# Patient Record
Sex: MALE | Race: WHITE | Employment: FULL TIME | URBAN - METROPOLITAN AREA
[De-identification: names, ages, dates, MRNs, and addresses within clinical notes are randomized per-mention and may not be internally consistent; named-entity substitution may affect disease eponyms.]

---

## 2021-09-20 RX ORDER — ARIPIPRAZOLE 2 MG/1
2 TABLET ORAL DAILY
COMMUNITY
End: 2022-07-14

## 2021-09-20 RX ORDER — ALBUTEROL SULFATE 90 UG/1
2 AEROSOL, METERED RESPIRATORY (INHALATION) EVERY 6 HOURS PRN
COMMUNITY

## 2021-09-20 RX ORDER — SERTRALINE HYDROCHLORIDE 100 MG/1
100 TABLET, FILM COATED ORAL DAILY
COMMUNITY

## 2021-09-20 RX ORDER — LORATADINE 10 MG/1
10 TABLET ORAL DAILY
COMMUNITY

## 2021-09-20 RX ORDER — METHOCARBAMOL 750 MG/1
750 TABLET, FILM COATED ORAL 4 TIMES DAILY PRN
COMMUNITY

## 2021-09-20 RX ORDER — BUTALBITAL, ACETAMINOPHEN AND CAFFEINE 50; 325; 40 MG/1; MG/1; MG/1
1 TABLET ORAL EVERY 4 HOURS PRN
COMMUNITY

## 2021-09-20 RX ORDER — BUPROPION HYDROCHLORIDE 150 MG/1
150 TABLET, EXTENDED RELEASE ORAL 2 TIMES DAILY
COMMUNITY
End: 2021-09-21

## 2021-09-20 RX ORDER — CELECOXIB 100 MG/1
100 CAPSULE ORAL 2 TIMES DAILY
COMMUNITY

## 2021-09-20 RX ORDER — FLUTICASONE PROPIONATE 50 MCG
1 SPRAY, SUSPENSION (ML) NASAL DAILY
COMMUNITY

## 2021-09-20 RX ORDER — ATENOLOL 25 MG/1
25 TABLET ORAL PRN
COMMUNITY

## 2021-09-20 RX ORDER — HYDROXYZINE PAMOATE 25 MG/1
25 CAPSULE ORAL 3 TIMES DAILY PRN
COMMUNITY

## 2021-09-20 RX ORDER — GABAPENTIN 600 MG/1
600 TABLET ORAL 4 TIMES DAILY
COMMUNITY

## 2021-09-20 RX ORDER — MORPHINE SULFATE 15 MG/1
15 TABLET, FILM COATED, EXTENDED RELEASE ORAL 2 TIMES DAILY
COMMUNITY
End: 2022-07-14

## 2021-09-20 RX ORDER — HYDROCODONE BITARTRATE AND ACETAMINOPHEN 5; 325 MG/1; MG/1
1 TABLET ORAL EVERY 6 HOURS PRN
COMMUNITY
End: 2022-07-14

## 2021-09-21 RX ORDER — APREMILAST 30 MG/1
TABLET, FILM COATED ORAL 2 TIMES DAILY
COMMUNITY

## 2021-09-21 NOTE — PROGRESS NOTES
Helixate order in epic. Order recommendations per Hematologist in [facility] on 8/26 and printed in chart. Spoke with Genoveva in pharmacy. She stated medication is already in pharmacy and order in [facility]. Nothing else needs to be done.   All coordinators notified via email. Vivian Traore

## 2021-09-21 NOTE — PROGRESS NOTES
[address] [facility] patients having surgery or anesthesia are required to be Covid tested OR to have been vaccinated at least 14 days prior to your procedure. It is very important to return our call to [phone] and notify the staff of your last vaccination date otherwise you will be required to complete Covid PCR test within the 5-6 days prior to surgery & quarantine. The results will need to be faxed to PreAdmission Testing at [phone]. PRIOR TO PROCEDURE DATE:        1. PLEASE FOLLOW ANY  GUIDELINES/ INSTRUCTIONS PRIOR TO YOUR PROCEDURE AS ADVISED BY YOUR SURGEON. 2. Arrange for someone to drive you home and be with you for the first 24 hours after discharge for your safety after your procedure for which you received sedation. Ensure it is someone we can share information with regarding your discharge. 3. You must contact your surgeon for instructions IF:   You are taking any blood thinners, aspirin, anti-inflammatory or vitamin E.   There is a change in your physical condition such as a cold, fever, rash, cuts, sores or any other infection, especially near your surgical site. 4. Do not drink alcohol the day before or day of your procedure. 5. A Pre-op History and Physical for surgery MUST be completed by your Physician or Urgent Care within 30 days of your procedure date. Please bring a copy with you on the day of your procedure and along with any other testing performed. THE DAY OF YOUR PROCEDURE:  1. Follow instructions for ARRIVAL TIME as DIRECTED BY YOUR SURGEON. 2. Enter the MAIN entrance from [address] [place] [address] and follow the signs to the free [employer] or [employer] parking (offered free of charge 6am-5pm). 3. Enter the Main Entrance of the hospital (do not enter from the lower level of the parking garage). Upon entrance, check in with the  at the main desk on your left. If no one is available at the desk, proceed into the [facility] Waiting Room and go through the door directly into the [facility]. There is a Check-in desk ACROSS from Room 5 (marked with a sign hanging from the ceiling). The phone number for the surgery center is [phone]. 4. Please call [phone] option #2 option #2 if you have not been preregistered yet. On the day of your procedure bring your insurance card and photo ID. You will be registered at your bedside once brought back to your room. 5. DO NOT EAT ANYTHING eight hours prior to your arrival for surgery. May have 8 ounces of water 4 hours prior to your arrival for surgery. NOTE: ALL Gastric, Bariatric and Bowel surgery patients MUST follow their surgeon's instructions. 6. MEDICATIONS    Take the following medications with a SMALL sip of water: MAY HAVE MEDICATIONS AS NEEDED AS LONG AS TOLERATED WITH SIP OF WATER    Bariatric patient's call surgeon if on diabetic medications as some need to be stopped 1 week preop   Use your usual dose of inhalers the morning of surgery. BRING your rescue inhaler with you to hospital.    Anesthesia does NOT want you to take insulin the morning of surgery. They will control your blood sugar while you are at the hospital. Please contact your ordering physician for instructions regarding your insulin the night before your procedure. If you have an insulin pump, please keep it set on basal rate. 7. Do not swallow water when brushing teeth. No gum, candy, mints or ice chips. Refrain from smoking or at least decrease the amount. 8. Dress in loose, comfortable clothing appropriate for redressing after your procedure. Do not wear jewelry (including body piercings), make-up (especially NO eye make-up), fingernail polish (NO toenail polish if foot/leg surgery), lotion, powders or metal hairclips. 9. Dentures, glasses, or contacts will need to be removed before your procedure.  Bring cases for your glasses, contacts, dentures, or hearing aids to protect them while you are in surgery. 10. If you use a CPAP, please bring it with you on the day of your procedure. 11. We recommend that valuable personal  belongings such as cash, cell phones, e-tablets or jewelry, be left at home during your stay. The hospital will not be responsible for valuables that are not secured in the hospital safe. However, if your insurance requires a co-pay, you may want to bring a method of payment, i.e. Check or credit card, if you wish to pay your co-pay the day of surgery. 12. If you are to stay overnight, you may bring a bag with personal items. Please have any large items you may need brought in by your family after your arrival to your hospital room. 15. If you have a Living Will or Durable Power of , please bring a copy on the day of your procedure. 15. With your permission, one family member may accompany you while you are being prepared for surgery. Once you are ready, additional family members may join you. HOW WE KEEP YOU SAFE and WORK TO PREVENT SURGICAL SITE INFECTIONS:  1. Health care workers should always check your ID bracelet to verify your name and birth date. You will be asked many times to state your name, date of birth, and allergies. 2. Health care workers should always clean their hands with soap or alcohol gel before providing care to you. It is okay to ask anyone if they cleaned their hands before they touch you. 3. You will be actively involved in verifying the type of procedure you are having and ensuring the correct surgical site. This will be confirmed multiple times prior to your procedure. Do NOT jamie your surgery site UNLESS instructed to by your surgeon. 4. Do not shave or wax for 72 hours prior to procedure near your operative site. Shaving with a razor can irritate your skin and make it easier to develop an infection.  On the day of your procedure, any hair that needs to be removed near the surgical site will be clipped by a healthcare worker using a special clippers designed to avoid skin irritation. 5. When you are in the operating room, your surgical site will be cleansed with a special soap, and in most cases, you will be given an antibiotic before the surgery begins. What to expect AFTER YOUR PROCEDURE:  1. Immediately following your procedure, your will be taken to the PACU for the first phase of your recovery. Your nurse will help you recover from any potential side effects of anesthesia, such as extreme drowsiness, changes in your vital signs or breathing patterns. Nausea, headache, muscle aches, or sore throat may also occur after anesthesia. Your nurse will help you manage these potential side effects. 2. For comfort and safety, arrange to have someone at home with you for the first 24 hours after discharge. 3. You and your family will be given written instructions about your diet, activity, dressing care, medications, and return visits. 4. Once at home, should issues with nausea, pain, or bleeding occur, or should you notice any signs of infection, you should call your surgeon. 5. Always clean your hands before and after caring for your wound. Do not let your family touch your surgery site without cleaning their hands. 6. Narcotic pain medications can cause significant constipation. You may want to add a stool softener to your postoperative medication schedule or speak to your surgeon on how best to manage this SIDE EFFECT. SPECIAL INSTRUCTIONS     Thank you for allowing us to care for you. We strive to exceed your expectations in the delivery of care and service provided to you and your family. If you need to contact the [place] [address] staff for any reason, please call us at [phone]    Instructions reviewed with patient during preadmission testing phone interview.   Mavis Hoyt RN.9/21/2021 .8:45 AM      ADDITIONAL EDUCATIONAL INFORMATION REVIEWED PER PHONE WITH YOU AND/OR YOUR FAMILY:  Yes Hibiclens® Bathing Instructions - per surgeon   No Antibacterial Soap

## 2021-09-22 ENCOUNTER — ANESTHESIA EVENT (OUTPATIENT)
Dept: OPERATING ROOM | Age: 41
End: 2021-09-22
Payer: COMMERCIAL

## 2021-09-23 ENCOUNTER — ANESTHESIA (OUTPATIENT)
Dept: OPERATING ROOM | Age: 41
End: 2021-09-23
Payer: COMMERCIAL

## 2021-09-23 ENCOUNTER — HOSPITAL ENCOUNTER (OUTPATIENT)
Age: 41
Setting detail: OBSERVATION
Discharge: HOME | End: 2021-09-24
Attending: ORTHOPAEDIC SURGERY | Admitting: ORTHOPAEDIC SURGERY
Payer: COMMERCIAL

## 2021-09-23 ENCOUNTER — APPOINTMENT (OUTPATIENT)
Dept: GENERAL RADIOLOGY | Age: 41
End: 2021-09-23
Attending: ORTHOPAEDIC SURGERY
Payer: COMMERCIAL

## 2021-09-23 VITALS — DIASTOLIC BLOOD PRESSURE: 54 MMHG | TEMPERATURE: 97.7 F | SYSTOLIC BLOOD PRESSURE: 97 MMHG | OXYGEN SATURATION: 91 %

## 2021-09-23 PROBLEM — M19.072 ARTHRITIS OF LEFT ANKLE: Status: ACTIVE | Noted: 2021-09-23

## 2021-09-23 PROCEDURE — 3600000004 HC SURGERY LEVEL 4 BASE: Performed by: ORTHOPAEDIC SURGERY

## 2021-09-23 PROCEDURE — 2580000003 HC RX 258: Performed by: ORTHOPAEDIC SURGERY

## 2021-09-23 PROCEDURE — 6360000002 HC RX W HCPCS: Performed by: ANESTHESIOLOGY

## 2021-09-23 PROCEDURE — C1713 ANCHOR/SCREW BN/BN,TIS/BN: HCPCS | Performed by: ORTHOPAEDIC SURGERY

## 2021-09-23 PROCEDURE — C1776 JOINT DEVICE (IMPLANTABLE): HCPCS | Performed by: ORTHOPAEDIC SURGERY

## 2021-09-23 PROCEDURE — 6370000000 HC RX 637 (ALT 250 FOR IP): Performed by: ORTHOPAEDIC SURGERY

## 2021-09-23 PROCEDURE — G0378 HOSPITAL OBSERVATION PER HR: HCPCS

## 2021-09-23 PROCEDURE — 64445 NJX AA&/STRD SCIATIC NRV IMG: CPT | Performed by: ANESTHESIOLOGY

## 2021-09-23 PROCEDURE — 3600000014 HC SURGERY LEVEL 4 ADDTL 15MIN: Performed by: ORTHOPAEDIC SURGERY

## 2021-09-23 PROCEDURE — C9359 IMPLNT,BON VOID FILLER-PUTTY: HCPCS | Performed by: ORTHOPAEDIC SURGERY

## 2021-09-23 PROCEDURE — 2580000003 HC RX 258: Performed by: ANESTHESIOLOGY

## 2021-09-23 PROCEDURE — 2780000010 HC IMPLANT OTHER: Performed by: ORTHOPAEDIC SURGERY

## 2021-09-23 PROCEDURE — 6360000002 HC RX W HCPCS: Performed by: NURSE ANESTHETIST, CERTIFIED REGISTERED

## 2021-09-23 PROCEDURE — 2720000010 HC SURG SUPPLY STERILE: Performed by: ORTHOPAEDIC SURGERY

## 2021-09-23 PROCEDURE — 3700000000 HC ANESTHESIA ATTENDED CARE: Performed by: ORTHOPAEDIC SURGERY

## 2021-09-23 PROCEDURE — 2709999900 HC NON-CHARGEABLE SUPPLY: Performed by: ORTHOPAEDIC SURGERY

## 2021-09-23 PROCEDURE — 7100000000 HC PACU RECOVERY - FIRST 15 MIN: Performed by: ORTHOPAEDIC SURGERY

## 2021-09-23 PROCEDURE — 2500000003 HC RX 250 WO HCPCS: Performed by: NURSE ANESTHETIST, CERTIFIED REGISTERED

## 2021-09-23 PROCEDURE — 6360000002 HC RX W HCPCS: Performed by: ORTHOPAEDIC SURGERY

## 2021-09-23 PROCEDURE — 3209999900 FLUORO FOR SURGICAL PROCEDURES

## 2021-09-23 PROCEDURE — 64447 NJX AA&/STRD FEMORAL NRV IMG: CPT | Performed by: ANESTHESIOLOGY

## 2021-09-23 PROCEDURE — 94760 N-INVAS EAR/PLS OXIMETRY 1: CPT

## 2021-09-23 PROCEDURE — 7100000001 HC PACU RECOVERY - ADDTL 15 MIN: Performed by: ORTHOPAEDIC SURGERY

## 2021-09-23 PROCEDURE — 3700000001 HC ADD 15 MINUTES (ANESTHESIA): Performed by: ORTHOPAEDIC SURGERY

## 2021-09-23 PROCEDURE — 73600 X-RAY EXAM OF ANKLE: CPT

## 2021-09-23 DEVICE — DBX PUTTY, 1CC
Type: IMPLANTABLE DEVICE | Site: ANKLE | Status: FUNCTIONAL
Brand: DBX®

## 2021-09-23 DEVICE — IMPLANTABLE DEVICE: Type: IMPLANTABLE DEVICE | Site: ANKLE | Status: FUNCTIONAL

## 2021-09-23 DEVICE — OSSIOFIBER® TRIMMABLE FIXATION NAIL SYSTEM, 2.4X50MM.PACKAGE CONTAINS: 1 IMPLANT, 1 INSERTION SLEEVE, 1 BONE TAMP, 1 DEPTH GAUGE, 2 K-WIRES, 1 CANNULATED DRILL BIT.
Type: IMPLANTABLE DEVICE | Site: ANKLE | Status: FUNCTIONAL
Brand: OSSIOFIBER® TRIMMABLE FIXATION NAIL SYSTEM

## 2021-09-23 RX ORDER — GABAPENTIN 600 MG/1
600 TABLET ORAL 4 TIMES DAILY
Status: DISCONTINUED | OUTPATIENT
Start: 2021-09-23 | End: 2021-09-24 | Stop reason: HOSPADM

## 2021-09-23 RX ORDER — FLUTICASONE PROPIONATE 50 MCG
1 SPRAY, SUSPENSION (ML) NASAL DAILY
Status: DISCONTINUED | OUTPATIENT
Start: 2021-09-24 | End: 2021-09-24 | Stop reason: HOSPADM

## 2021-09-23 RX ORDER — SODIUM CHLORIDE 0.9 % (FLUSH) 0.9 %
5-40 SYRINGE (ML) INJECTION PRN
Status: DISCONTINUED | OUTPATIENT
Start: 2021-09-23 | End: 2021-09-24 | Stop reason: HOSPADM

## 2021-09-23 RX ORDER — ARIPIPRAZOLE 2 MG/1
2 TABLET ORAL NIGHTLY
Status: DISCONTINUED | OUTPATIENT
Start: 2021-09-23 | End: 2021-09-24 | Stop reason: HOSPADM

## 2021-09-23 RX ORDER — MIDAZOLAM HYDROCHLORIDE 1 MG/ML
INJECTION INTRAMUSCULAR; INTRAVENOUS
Status: COMPLETED
Start: 2021-09-23 | End: 2021-09-23

## 2021-09-23 RX ORDER — BUTALBITAL, ACETAMINOPHEN AND CAFFEINE 50; 325; 40 MG/1; MG/1; MG/1
1 TABLET ORAL EVERY 4 HOURS PRN
Status: DISCONTINUED | OUTPATIENT
Start: 2021-09-23 | End: 2021-09-24 | Stop reason: HOSPADM

## 2021-09-23 RX ORDER — FENTANYL CITRATE 50 UG/ML
INJECTION, SOLUTION INTRAMUSCULAR; INTRAVENOUS PRN
Status: DISCONTINUED | OUTPATIENT
Start: 2021-09-23 | End: 2021-09-23 | Stop reason: SDUPTHER

## 2021-09-23 RX ORDER — GABAPENTIN 300 MG/1
300 CAPSULE ORAL ONCE
Status: DISCONTINUED | OUTPATIENT
Start: 2021-09-23 | End: 2021-09-23 | Stop reason: HOSPADM

## 2021-09-23 RX ORDER — MORPHINE SULFATE 2 MG/ML
2 INJECTION, SOLUTION INTRAMUSCULAR; INTRAVENOUS
Status: DISCONTINUED | OUTPATIENT
Start: 2021-09-23 | End: 2021-09-24 | Stop reason: HOSPADM

## 2021-09-23 RX ORDER — SODIUM CHLORIDE 9 MG/ML
INJECTION, SOLUTION INTRAVENOUS CONTINUOUS
Status: DISCONTINUED | OUTPATIENT
Start: 2021-09-23 | End: 2021-09-24 | Stop reason: HOSPADM

## 2021-09-23 RX ORDER — ONDANSETRON 4 MG/1
4 TABLET, ORALLY DISINTEGRATING ORAL EVERY 8 HOURS PRN
Status: DISCONTINUED | OUTPATIENT
Start: 2021-09-23 | End: 2021-09-24 | Stop reason: HOSPADM

## 2021-09-23 RX ORDER — MORPHINE SULFATE 2 MG/ML
4 INJECTION, SOLUTION INTRAMUSCULAR; INTRAVENOUS
Status: DISCONTINUED | OUTPATIENT
Start: 2021-09-23 | End: 2021-09-24 | Stop reason: HOSPADM

## 2021-09-23 RX ORDER — SODIUM CHLORIDE, SODIUM LACTATE, POTASSIUM CHLORIDE, CALCIUM CHLORIDE 600; 310; 30; 20 MG/100ML; MG/100ML; MG/100ML; MG/100ML
INJECTION, SOLUTION INTRAVENOUS CONTINUOUS
Status: DISCONTINUED | OUTPATIENT
Start: 2021-09-23 | End: 2021-09-24 | Stop reason: HOSPADM

## 2021-09-23 RX ORDER — HYDROMORPHONE HCL 110MG/55ML
PATIENT CONTROLLED ANALGESIA SYRINGE INTRAVENOUS PRN
Status: DISCONTINUED | OUTPATIENT
Start: 2021-09-23 | End: 2021-09-23 | Stop reason: SDUPTHER

## 2021-09-23 RX ORDER — SODIUM CHLORIDE 9 MG/ML
25 INJECTION, SOLUTION INTRAVENOUS PRN
Status: DISCONTINUED | OUTPATIENT
Start: 2021-09-23 | End: 2021-09-24 | Stop reason: HOSPADM

## 2021-09-23 RX ORDER — LIDOCAINE HCL/PF 100 MG/5ML
SYRINGE (ML) INJECTION PRN
Status: DISCONTINUED | OUTPATIENT
Start: 2021-09-23 | End: 2021-09-23 | Stop reason: SDUPTHER

## 2021-09-23 RX ORDER — ROPIVACAINE HYDROCHLORIDE 5 MG/ML
INJECTION, SOLUTION EPIDURAL; INFILTRATION; PERINEURAL
Status: COMPLETED
Start: 2021-09-23 | End: 2021-09-23

## 2021-09-23 RX ORDER — ACETAMINOPHEN 325 MG/1
650 TABLET ORAL EVERY 6 HOURS
Status: DISCONTINUED | OUTPATIENT
Start: 2021-09-23 | End: 2021-09-24 | Stop reason: HOSPADM

## 2021-09-23 RX ORDER — ONDANSETRON 2 MG/ML
INJECTION INTRAMUSCULAR; INTRAVENOUS PRN
Status: DISCONTINUED | OUTPATIENT
Start: 2021-09-23 | End: 2021-09-23 | Stop reason: SDUPTHER

## 2021-09-23 RX ORDER — MORPHINE SULFATE 15 MG/1
15 TABLET, FILM COATED, EXTENDED RELEASE ORAL 2 TIMES DAILY
Status: DISCONTINUED | OUTPATIENT
Start: 2021-09-23 | End: 2021-09-24 | Stop reason: HOSPADM

## 2021-09-23 RX ORDER — OXYCODONE HYDROCHLORIDE 5 MG/1
5 TABLET ORAL
Status: DISCONTINUED | OUTPATIENT
Start: 2021-09-23 | End: 2021-09-24 | Stop reason: HOSPADM

## 2021-09-23 RX ORDER — HYDROXYZINE PAMOATE 25 MG/1
25 CAPSULE ORAL 3 TIMES DAILY PRN
Status: DISCONTINUED | OUTPATIENT
Start: 2021-09-23 | End: 2021-09-24 | Stop reason: HOSPADM

## 2021-09-23 RX ORDER — ROPIVACAINE HYDROCHLORIDE 5 MG/ML
INJECTION, SOLUTION EPIDURAL; INFILTRATION; PERINEURAL PRN
Status: DISCONTINUED | OUTPATIENT
Start: 2021-09-23 | End: 2021-09-23 | Stop reason: SDUPTHER

## 2021-09-23 RX ORDER — PROPOFOL 10 MG/ML
INJECTION, EMULSION INTRAVENOUS PRN
Status: DISCONTINUED | OUTPATIENT
Start: 2021-09-23 | End: 2021-09-23 | Stop reason: SDUPTHER

## 2021-09-23 RX ORDER — FENTANYL CITRATE 50 UG/ML
INJECTION, SOLUTION INTRAMUSCULAR; INTRAVENOUS
Status: COMPLETED
Start: 2021-09-23 | End: 2021-09-23

## 2021-09-23 RX ORDER — ONDANSETRON 2 MG/ML
4 INJECTION INTRAMUSCULAR; INTRAVENOUS EVERY 6 HOURS PRN
Status: DISCONTINUED | OUTPATIENT
Start: 2021-09-23 | End: 2021-09-24 | Stop reason: HOSPADM

## 2021-09-23 RX ORDER — DEXAMETHASONE SODIUM PHOSPHATE 10 MG/ML
INJECTION, SOLUTION INTRAMUSCULAR; INTRAVENOUS
Status: DISPENSED
Start: 2021-09-23 | End: 2021-09-24

## 2021-09-23 RX ORDER — ALBUTEROL SULFATE 2.5 MG/3ML
2.5 SOLUTION RESPIRATORY (INHALATION) EVERY 6 HOURS PRN
Status: DISCONTINUED | OUTPATIENT
Start: 2021-09-23 | End: 2021-09-24 | Stop reason: HOSPADM

## 2021-09-23 RX ORDER — SODIUM CHLORIDE 0.9 % (FLUSH) 0.9 %
5-40 SYRINGE (ML) INJECTION EVERY 12 HOURS SCHEDULED
Status: DISCONTINUED | OUTPATIENT
Start: 2021-09-23 | End: 2021-09-24 | Stop reason: HOSPADM

## 2021-09-23 RX ORDER — MIDAZOLAM HYDROCHLORIDE 1 MG/ML
INJECTION INTRAMUSCULAR; INTRAVENOUS PRN
Status: DISCONTINUED | OUTPATIENT
Start: 2021-09-23 | End: 2021-09-23 | Stop reason: SDUPTHER

## 2021-09-23 RX ORDER — DEXAMETHASONE SODIUM PHOSPHATE 4 MG/ML
INJECTION, SOLUTION INTRA-ARTICULAR; INTRALESIONAL; INTRAMUSCULAR; INTRAVENOUS; SOFT TISSUE PRN
Status: DISCONTINUED | OUTPATIENT
Start: 2021-09-23 | End: 2021-09-23 | Stop reason: SDUPTHER

## 2021-09-23 RX ADMIN — MIDAZOLAM HYDROCHLORIDE 2 MG: 2 INJECTION, SOLUTION INTRAMUSCULAR; INTRAVENOUS at 13:16

## 2021-09-23 RX ADMIN — FENTANYL CITRATE 50 MCG: 50 INJECTION, SOLUTION INTRAMUSCULAR; INTRAVENOUS at 14:45

## 2021-09-23 RX ADMIN — ACETAMINOPHEN 650 MG: 325 TABLET ORAL at 21:12

## 2021-09-23 RX ADMIN — MORPHINE SULFATE 15 MG: 15 TABLET, FILM COATED, EXTENDED RELEASE ORAL at 21:13

## 2021-09-23 RX ADMIN — DEXAMETHASONE SODIUM PHOSPHATE 4 MG: 4 INJECTION, SOLUTION INTRAMUSCULAR; INTRAVENOUS at 14:35

## 2021-09-23 RX ADMIN — ONDANSETRON 4 MG: 2 INJECTION INTRAMUSCULAR; INTRAVENOUS at 14:47

## 2021-09-23 RX ADMIN — MIDAZOLAM HYDROCHLORIDE 2 MG: 2 INJECTION, SOLUTION INTRAMUSCULAR; INTRAVENOUS at 13:58

## 2021-09-23 RX ADMIN — FENTANYL CITRATE 50 MCG: 50 INJECTION, SOLUTION INTRAMUSCULAR; INTRAVENOUS at 14:02

## 2021-09-23 RX ADMIN — ARIPIPRAZOLE 2 MG: 2 TABLET ORAL at 21:13

## 2021-09-23 RX ADMIN — SODIUM CHLORIDE, POTASSIUM CHLORIDE, SODIUM LACTATE AND CALCIUM CHLORIDE: 600; 310; 30; 20 INJECTION, SOLUTION INTRAVENOUS at 13:58

## 2021-09-23 RX ADMIN — ROPIVACAINE HYDROCHLORIDE 30 ML: 5 INJECTION, SOLUTION EPIDURAL; INFILTRATION; PERINEURAL at 13:20

## 2021-09-23 RX ADMIN — SODIUM CHLORIDE, POTASSIUM CHLORIDE, SODIUM LACTATE AND CALCIUM CHLORIDE: 600; 310; 30; 20 INJECTION, SOLUTION INTRAVENOUS at 12:22

## 2021-09-23 RX ADMIN — Medication 50 MG: at 14:02

## 2021-09-23 RX ADMIN — PROPOFOL 200 MG: 10 INJECTION, EMULSION INTRAVENOUS at 14:02

## 2021-09-23 RX ADMIN — CEFAZOLIN 2000 MG: 10 INJECTION, POWDER, FOR SOLUTION INTRAVENOUS at 21:18

## 2021-09-23 RX ADMIN — OXYCODONE 5 MG: 5 TABLET ORAL at 18:51

## 2021-09-23 RX ADMIN — ROPIVACAINE HYDROCHLORIDE 30 ML: 5 INJECTION, SOLUTION EPIDURAL; INFILTRATION; PERINEURAL at 13:53

## 2021-09-23 RX ADMIN — GABAPENTIN 600 MG: 600 TABLET, FILM COATED ORAL at 21:13

## 2021-09-23 RX ADMIN — SODIUM CHLORIDE: 9 INJECTION, SOLUTION INTRAVENOUS at 18:45

## 2021-09-23 RX ADMIN — FENTANYL CITRATE 100 MCG: 50 INJECTION, SOLUTION INTRAMUSCULAR; INTRAVENOUS at 13:16

## 2021-09-23 RX ADMIN — HYDROMORPHONE HYDROCHLORIDE 1 MG: 2 INJECTION, SOLUTION INTRAMUSCULAR; INTRAVENOUS; SUBCUTANEOUS at 15:47

## 2021-09-23 ASSESSMENT — PULMONARY FUNCTION TESTS
PIF_VALUE: 20
PIF_VALUE: 9
PIF_VALUE: 4
PIF_VALUE: 20
PIF_VALUE: 6
PIF_VALUE: 9
PIF_VALUE: 4
PIF_VALUE: 20
PIF_VALUE: 6
PIF_VALUE: 5
PIF_VALUE: 19
PIF_VALUE: 9
PIF_VALUE: 9
PIF_VALUE: 4
PIF_VALUE: 9
PIF_VALUE: 4
PIF_VALUE: 5
PIF_VALUE: 4
PIF_VALUE: 9
PIF_VALUE: 4
PIF_VALUE: 4
PIF_VALUE: 5
PIF_VALUE: 5
PIF_VALUE: 4
PIF_VALUE: 20
PIF_VALUE: 4
PIF_VALUE: 5
PIF_VALUE: 4
PIF_VALUE: 5
PIF_VALUE: 4
PIF_VALUE: 5
PIF_VALUE: 11
PIF_VALUE: 6
PIF_VALUE: 4
PIF_VALUE: 4
PIF_VALUE: 18
PIF_VALUE: 4
PIF_VALUE: 9
PIF_VALUE: 0
PIF_VALUE: 5
PIF_VALUE: 4
PIF_VALUE: 5
PIF_VALUE: 2
PIF_VALUE: 18
PIF_VALUE: 4
PIF_VALUE: 6
PIF_VALUE: 6
PIF_VALUE: 20
PIF_VALUE: 9
PIF_VALUE: 4
PIF_VALUE: 9
PIF_VALUE: 1
PIF_VALUE: 18
PIF_VALUE: 1
PIF_VALUE: 6
PIF_VALUE: 18
PIF_VALUE: 4
PIF_VALUE: 18
PIF_VALUE: 20
PIF_VALUE: 4
PIF_VALUE: 6
PIF_VALUE: 5
PIF_VALUE: 20
PIF_VALUE: 18
PIF_VALUE: 4
PIF_VALUE: 4
PIF_VALUE: 5
PIF_VALUE: 18
PIF_VALUE: 4
PIF_VALUE: 20
PIF_VALUE: 0
PIF_VALUE: 20
PIF_VALUE: 6
PIF_VALUE: 18
PIF_VALUE: 6
PIF_VALUE: 5
PIF_VALUE: 6
PIF_VALUE: 4
PIF_VALUE: 18
PIF_VALUE: 4
PIF_VALUE: 6
PIF_VALUE: 3
PIF_VALUE: 4
PIF_VALUE: 4
PIF_VALUE: 9
PIF_VALUE: 5
PIF_VALUE: 9
PIF_VALUE: 0
PIF_VALUE: 20
PIF_VALUE: 4
PIF_VALUE: 14
PIF_VALUE: 7
PIF_VALUE: 5
PIF_VALUE: 9
PIF_VALUE: 9
PIF_VALUE: 4
PIF_VALUE: 5
PIF_VALUE: 7
PIF_VALUE: 6
PIF_VALUE: 9
PIF_VALUE: 4
PIF_VALUE: 0
PIF_VALUE: 4
PIF_VALUE: 9
PIF_VALUE: 2
PIF_VALUE: 4
PIF_VALUE: 5
PIF_VALUE: 4
PIF_VALUE: 9
PIF_VALUE: 20
PIF_VALUE: 0
PIF_VALUE: 9
PIF_VALUE: 9
PIF_VALUE: 18
PIF_VALUE: 9
PIF_VALUE: 20
PIF_VALUE: 4
PIF_VALUE: 8
PIF_VALUE: 8
PIF_VALUE: 5
PIF_VALUE: 18
PIF_VALUE: 15
PIF_VALUE: 3
PIF_VALUE: 5
PIF_VALUE: 4
PIF_VALUE: 4
PIF_VALUE: 5
PIF_VALUE: 8
PIF_VALUE: 7
PIF_VALUE: 9
PIF_VALUE: 4
PIF_VALUE: 10
PIF_VALUE: 4
PIF_VALUE: 5
PIF_VALUE: 4
PIF_VALUE: 5
PIF_VALUE: 4
PIF_VALUE: 4
PIF_VALUE: 10
PIF_VALUE: 0
PIF_VALUE: 4
PIF_VALUE: 4
PIF_VALUE: 5
PIF_VALUE: 10
PIF_VALUE: 10
PIF_VALUE: 6
PIF_VALUE: 5
PIF_VALUE: 8
PIF_VALUE: 6
PIF_VALUE: 5
PIF_VALUE: 6
PIF_VALUE: 6
PIF_VALUE: 5
PIF_VALUE: 9
PIF_VALUE: 9
PIF_VALUE: 3
PIF_VALUE: 6
PIF_VALUE: 3
PIF_VALUE: 9
PIF_VALUE: 20
PIF_VALUE: 12
PIF_VALUE: 9
PIF_VALUE: 7
PIF_VALUE: 5
PIF_VALUE: 20
PIF_VALUE: 9
PIF_VALUE: 18
PIF_VALUE: 0
PIF_VALUE: 4
PIF_VALUE: 5
PIF_VALUE: 5
PIF_VALUE: 4
PIF_VALUE: 7
PIF_VALUE: 20
PIF_VALUE: 6
PIF_VALUE: 5
PIF_VALUE: 6
PIF_VALUE: 19
PIF_VALUE: 4
PIF_VALUE: 5
PIF_VALUE: 6
PIF_VALUE: 5
PIF_VALUE: 9
PIF_VALUE: 3
PIF_VALUE: 17
PIF_VALUE: 10
PIF_VALUE: 6
PIF_VALUE: 3
PIF_VALUE: 0

## 2021-09-23 ASSESSMENT — PAIN DESCRIPTION - ONSET
ONSET: AWAKENED FROM SLEEP
ONSET: ON-GOING

## 2021-09-23 ASSESSMENT — PAIN DESCRIPTION - LOCATION
LOCATION: ANKLE
LOCATION: LEG
LOCATION: LEG

## 2021-09-23 ASSESSMENT — PAIN - FUNCTIONAL ASSESSMENT
PAIN_FUNCTIONAL_ASSESSMENT: PREVENTS OR INTERFERES SOME ACTIVE ACTIVITIES AND ADLS
PAIN_FUNCTIONAL_ASSESSMENT: 0-10

## 2021-09-23 ASSESSMENT — PAIN DESCRIPTION - PAIN TYPE
TYPE: ACUTE PAIN;SURGICAL PAIN

## 2021-09-23 ASSESSMENT — PAIN SCALES - GENERAL
PAINLEVEL_OUTOF10: 0
PAINLEVEL_OUTOF10: 2
PAINLEVEL_OUTOF10: 2
PAINLEVEL_OUTOF10: 6
PAINLEVEL_OUTOF10: 4

## 2021-09-23 ASSESSMENT — PAIN DESCRIPTION - ORIENTATION
ORIENTATION: LEFT;UPPER
ORIENTATION: LEFT
ORIENTATION: LEFT;UPPER

## 2021-09-23 ASSESSMENT — PAIN DESCRIPTION - PROGRESSION: CLINICAL_PROGRESSION: NOT CHANGED

## 2021-09-23 ASSESSMENT — PAIN DESCRIPTION - FREQUENCY
FREQUENCY: CONTINUOUS

## 2021-09-23 ASSESSMENT — PAIN DESCRIPTION - DESCRIPTORS
DESCRIPTORS: ACHING
DESCRIPTORS: ACHING
DESCRIPTORS: ACHING;BURNING;STABBING;THROBBING
DESCRIPTORS: ACHING

## 2021-09-23 ASSESSMENT — LIFESTYLE VARIABLES: SMOKING_STATUS: 1

## 2021-09-23 NOTE — ANESTHESIA POSTPROCEDURE EVALUATION
Department of Anesthesiology  Postprocedure Note    Patient: Dione Moreno  MRN: [id number]  [place]: 1980  Date of evaluation: 9/23/2021  Time:  5:32 PM     Procedure Summary     Date: 09/23/21 Room / Location: [facility] [address]    Anesthesia Start: 7513 Anesthesia Stop: 5322    Procedure: LEFT ANKLE REPLACEMENT; GASTROCNEMIUS RECESSION (Left Ankle) Diagnosis:       Osteoarthrosis of ankle and foot, left      Acquired short Achilles tendon, left      (Osteoarthrosis of ankle and foot, left [M19.072] Acquired short Achilles tendon, left [M67.02])    Surgeons: Narinder Watt MD Responsible Provider: Shantel Eid DO    Anesthesia Type: general, regional ASA Status: 3          Anesthesia Type: general, regional    Stanley Phase I: Stanley Score: 10    Stanley Phase II:      Last vitals: Reviewed and per EMR flowsheets.        Anesthesia Post Evaluation    Patient location during evaluation: PACU  Patient participation: complete - patient participated  Level of consciousness: awake  Pain score: 0  Airway patency: patent  Nausea & Vomiting: no nausea and no vomiting  Complications: no  Cardiovascular status: blood pressure returned to baseline  Respiratory status: acceptable  Hydration status: euvolemic

## 2021-09-23 NOTE — BRIEF OP NOTE
Brief Postoperative Note      Patient: Uvaldo Fox  YOB: 1980  MRN: [id number]    Date of Procedure: 9/23/2021    Pre-Op Diagnosis: Osteoarthrosis of ankle and foot, left [M19.072] Acquired short Achilles tendon, left [M67.02]    Post-Op Diagnosis: Same       Procedure(s):  LEFT ANKLE REPLACEMENT; GASTROCNEMIUS RECESSION    Surgeon(s):  Renetta Shields MD    Assistant:  Surgical Assistant: Serjio Pereira    Anesthesia: General    Estimated Blood Loss (mL): Minimal    Complications: None      Electronically signed by Renetta Shields MD on 9/23/2021 at 5:01 PM

## 2021-09-23 NOTE — ANESTHESIA PROCEDURE NOTES
Peripheral Block    Patient location during procedure: procedure area  Start time: 9/23/2021 1:21 PM  End time: 9/23/2021 1:22 PM  Staffing  Performed: anesthesiologist   Anesthesiologist: Yuliya Burdick DO  Preanesthetic Checklist  Completed: patient identified, IV checked, site marked, risks and benefits discussed, surgical consent, monitors and equipment checked, pre-op evaluation, timeout performed, anesthesia consent given, oxygen available and patient being monitored  Peripheral Block  Patient position: supine  Prep: ChloraPrep  Patient monitoring: cardiac monitor, continuous pulse ox, frequent blood pressure checks and IV access  Block type: Femoral  Laterality: left  Injection technique: single-shot  Guidance: ultrasound guided  Local infiltration: ropivacaine  Adductor canal  Provider prep: sterile gloves and mask  Local infiltration: ropivacaine  Needle  Needle type: combined needle/nerve stimulator   Needle gauge: 20 G  Needle length: 8 cm  Needle localization: ultrasound guidance  Needle insertion depth: 4 cm  Test dose: negative  Assessment  Injection assessment: negative aspiration for heme, no paresthesia on injection and local visualized surrounding nerve on ultrasound  Paresthesia pain: none  Slow fractionated injection: yes  Hemodynamics: stable  Additional Notes  Left Adductor Canal Block Note    Indication: Postoperative analgesia upon request of the attending surgeon. Procedure: Informed consent obtained and pre-procedural timeout procedure performed. Patient supine. Left thigh externally rotated. Sterile prep. A 20 g 80mm insulated regional block needle was inserted at the level of the mid-thigh and directed under ultrasound visualization into the adductor canal, with the needle tip visualized just lateral to the femoral artery.   Ropivacaine 0.5% 10 cc and 4 mg of decadron and 10 cc of saline  was injected under ultrasound visualization with good spread noted around the femoral artery. 20cc total volume injected. Negative aspiration for heme prior to injection and at several points during injection. Procedure tolerated well. No apparent complications.   Reason for block: post-op pain management and at surgeon's request

## 2021-09-23 NOTE — PROGRESS NOTES
PACU Transfer Note    Vitals:    09/23/21 1830   BP: 117/84   Pulse: 70   Resp: 12   Temp: 98.1 °F (36.7 °C)   SpO2: 97% 2L        In: 2000 [I.V.:2000]  Out: 20     Pain assessment:  present - adequately treated- given PO boom prior to transfer for tourniquet pain 4/10  Pain Level: 4    Report given to Receiving unit RN at bedside in pacu at [address] Patient transported to  with 1 black back pack and 1 hospital bag of belongings.  Dinner order placed with dietary to be delivered to 5524.     9/23/2021 6:55 PM

## 2021-09-23 NOTE — ANESTHESIA PRE PROCEDURE
Department of Anesthesiology  Preprocedure Note       Name:  Hans Mena   Age:  36 y.o.  :  1980                                          MRN:  [id number]         Date:  2021      Surgeon: Too Kuhn):  Angeles Burkett MD    Procedure: Procedure(s):  LEFT ANKLE REPLACEMENT; GASTROCNEMIUS RECESSION  . Medications prior to admission:   Prior to Admission medications    Medication Sig Start Date End Date Taking? Authorizing Provider   Apremilast (OTEZLA) 30 MG TABS Take by mouth 2 times daily OTEZLA   Yes Historical Provider, MD   albuterol sulfate HFA (VENTOLIN HFA) 108 (90 Base) MCG/ACT inhaler Inhale 2 puffs into the lungs every 6 hours as needed for Wheezing   Yes Historical Provider, MD   ARIPiprazole (ABILIFY) 2 MG tablet Take 2 mg by mouth daily   Yes Historical Provider, MD   atenolol (TENORMIN) 25 MG tablet Take 25 mg by mouth as needed For anxiety   Yes Historical Provider, MD   butalbital-acetaminophen-caffeine (FIORICET, ESGIC) -40 MG per tablet Take 1 tablet by mouth every 4 hours as needed for Headaches   Yes Historical Provider, MD   celecoxib (CELEBREX) 100 MG capsule Take 100 mg by mouth 2 times daily   Yes Historical Provider, MD   fluticasone (FLONASE) 50 MCG/ACT nasal spray 1 spray by Each Nostril route daily   Yes Historical Provider, MD   gabapentin (NEURONTIN) 600 MG tablet Take 600 mg by mouth 4 times daily. Yes Historical Provider, MD   HYDROcodone-acetaminophen (NORCO) 5-325 MG per tablet Take 1 tablet by mouth every 6 hours as needed for Pain.    Yes Historical Provider, MD   hydrOXYzine (VISTARIL) 25 MG capsule Take 25 mg by mouth 3 times daily as needed for Itching   Yes Historical Provider, MD   loratadine (CLARITIN) 10 MG tablet Take 10 mg by mouth daily   Yes Historical Provider, MD   methocarbamol (ROBAXIN) 750 MG tablet Take 750 mg by mouth 4 times daily as needed    Yes Historical Provider, MD   morphine (MS CONTIN) 15 MG extended release tablet Take 15 mg by mouth 2 times daily. Yes Historical Provider, MD   sertraline (ZOLOFT) 100 MG tablet Take 100 mg by mouth daily   Yes Historical Provider, MD   NONFORMULARY Infuse 4,000 Units intravenously as needed Antihemophilic factor hum rec (Helixate FS IV)  Patient bring instructions for dosing. He used this at home as needed. Hematologist Dr David Roth [phone]   Yes Historical Provider, MD       Current medications:    Current Facility-Administered Medications   Medication Dose Route Frequency Provider Last Rate Last Admin    ceFAZolin (ANCEF) 2000 mg in dextrose 5 % 50 mL IVPB  2,000 mg IntraVENous Once Renetta Shields MD        gabapentin (NEURONTIN) capsule 300 mg  300 mg Oral Once Renetta Shields MD        lactated ringers infusion   IntraVENous Continuous Derek Deiters,  mL/hr at 09/23/21 1222 New Bag at 09/23/21 1222    [START ON 4/96/4234] antihemophilic factor (recombinant) (KOGENATE) injection 2,088 Units  2,088 Units Intravenous (Continuous Infusion) Once Renetta Shields MD           Allergies: Allergies   Allergen Reactions    Aspirin Other (See Comments)     History hemophilia  Due to hemophilia   Avoids due to bleeding disorder. Avoids due to bleeding disorder. Problem List:  There is no problem list on file for this patient.       Past Medical History:        Diagnosis Date    Anxiety     Arthritis     Chronic pain     Environmental allergies     Hemophilia ([facility] [address]     A    Hepatitis C     Major depressive disorder     Social phobia        Past Surgical History:        Procedure Laterality Date    ANKLE SURGERY Bilateral     X2 on each side     ELBOW SURGERY Left     LIVER BIOPSY      WISDOM TOOTH EXTRACTION         Social History:    Social History     Tobacco Use    Smoking status: Never Smoker    Smokeless tobacco: Never Used   Substance Use Topics    Alcohol use: Yes     Comment: rarely Counseling given: Not Answered      Vital Signs (Current):   Vitals:    09/21/21 0835 09/23/21 1147   BP:  132/81   Pulse:  69   Resp:  16   Temp:  98 °F (36.7 °C)   TempSrc:  Oral   SpO2:  97%   Weight: 165 lb (74.8 kg) 161 lb (73 kg)   Height: 5' 7\" (1.702 m) 5' 7\" (1.702 m)                                              BP Readings from Last 3 Encounters:   09/23/21 132/81       NPO Status: Time of last liquid consumption: 0800                        Time of last solid consumption: 2300                        Date of last liquid consumption: 09/23/21                        Date of last solid food consumption: 09/22/21    BMI:   Wt Readings from Last 3 Encounters:   09/23/21 161 lb (73 kg)     Body mass index is 25.22 kg/m². CBC: No results found for: WBC, RBC, HGB, HCT, MCV, RDW, PLT    CMP: No results found for: NA, K, CL, CO2, BUN, CREATININE, GFRAA, AGRATIO, LABGLOM, GLUCOSE, PROT, CALCIUM, BILITOT, ALKPHOS, AST, ALT    POC Tests: No results for input(s): POCGLU, POCNA, POCK, POCCL, POCBUN, POCHEMO, POCHCT in the last 72 hours.     Coags: No results found for: PROTIME, INR, APTT    HCG (If Applicable): No results found for: PREGTESTUR, PREGSERUM, HCG, HCGQUANT     ABGs: No results found for: PHART, PO2ART, WWP8YRN, ZGH1JDA, BEART, [id number]     Type & Screen (If Applicable):  No results found for: LABABO, LABRH    Drug/Infectious Status (If Applicable):  No results found for: HIV, HEPCAB    COVID-19 Screening (If Applicable): No results found for: COVID19        Anesthesia Evaluation  Patient summary reviewed and Nursing notes reviewed no history of anesthetic complications:   Airway: Mallampati: II  TM distance: >3 FB   Neck ROM: full  Mouth opening: > = 3 FB Dental: normal exam         Pulmonary: breath sounds clear to auscultation  (+) current smoker (vapes    and 2-3 cig/ )                          ROS comment: Had covid Nov 2020 resolved      Cardiovascular:  Exercise tolerance: good (>4 METS),       (-) past MI    NYHA Classification: II    Rhythm: regular  Rate: normal           Beta Blocker:  Not on Beta Blocker         Neuro/Psych:               GI/Hepatic/Renal:   (+) hepatitis (hep c since 1989  due to ddavp  tx ): C,           Endo/Other:                      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 Other Findings:             Anesthesia Plan      general and regional     ASA 3     (Left popliteal/adductor block )  Induction: intravenous. MIPS: Prophylactic antiemetics administered. Anesthetic plan and risks discussed with patient and spouse. Plan discussed with CRNA.     Attending anesthesiologist reviewed and agrees with Preprocedure content              Janie Snider DO   9/23/2021

## 2021-09-23 NOTE — ANESTHESIA PROCEDURE NOTES
Peripheral Block    Patient location during procedure: procedure area  Start time: 9/23/2021 1:18 PM  End time: 9/23/2021 1:20 PM  Staffing  Performed: anesthesiologist   Anesthesiologist: Tg Youngblood DO  Preanesthetic Checklist  Completed: patient identified, IV checked, site marked, risks and benefits discussed, surgical consent, monitors and equipment checked, pre-op evaluation, timeout performed, anesthesia consent given, oxygen available and patient being monitored  Peripheral Block  Patient position: supine  Prep: ChloraPrep  Patient monitoring: cardiac monitor, continuous pulse ox, frequent blood pressure checks and IV access  Block type: Sciatic  Laterality: left  Injection technique: single-shot  Guidance: ultrasound guided  Local infiltration: ropivacaine  Popliteal  Provider prep: sterile gloves and mask  Local infiltration: ropivacaine  Needle  Needle type: combined needle/nerve stimulator   Needle gauge: 20 G  Needle length: 8 cm  Needle localization: anatomical landmarks and ultrasound guidance  Needle insertion depth: 4 cm  Test dose: negative  Assessment  Injection assessment: negative aspiration for heme, no paresthesia on injection and local visualized surrounding nerve on ultrasound  Paresthesia pain: none  Slow fractionated injection: yes  Hemodynamics: stable  Additional Notes  Left Popliteal Fossa Sciatic Nerve Block    Indication: Postoperative analgesia upon surgeon request.    Procedure: Patient supine . Landmarks identified. Sterile prep and drape. A 20 G 80-mm insulated regional block needle was inserted 6cm cephalad of the left popliteal crease 1cm lateral to the midline; the hub of the needle was raised 30 degrees from horizontal and the needle was directed cephalad. Ropivacaine 0.5% 20 cc  With 10 cc of saline and 4 mg of decadron  was injected in 5cc increments to a total volume of 30cc with negative aspiration for heme prior to each injection.   The procedure was tolerated well and there were no apparent complications.   Reason for block: post-op pain management and at surgeon's request

## 2021-09-23 NOTE — H&P
[facility] Update H & P  Department of General Surgery   Surgical Service   Pre-operative History and Physical  Last H & P within the last 30 days. 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 History obtained from: Patient interview and EHR     HISTORY OF PRESENT ILLNESS:   Patient with left ankle pain, swelling and limited ROM in the setting of arthrosis. The symptoms have been recalcitrant to conservative treatment and the patient presents today for the above procedure. Covid 19:  Patient denies fever, chills, worsening cough, or known exposure to Covid-19.       Past Medical History:        Diagnosis Date    Anxiety     Arthritis     Chronic pain     Environmental allergies     Hemophilia ([address]     A    Hepatitis C     Major depressive disorder     Social phobia      Past Surgical History:        Procedure Laterality Date    ANKLE SURGERY Bilateral     X2 on each side     ELBOW SURGERY Left     LIVER BIOPSY      WISDOM TOOTH EXTRACTION       Past Social History:  Social History     Socioeconomic History    Marital status:      Spouse name: None    Number of children: None    Years of education: None    Highest education level: None   Occupational History    None   Tobacco Use    Smoking status: Never Smoker    Smokeless tobacco: Never Used   Vaping Use    Vaping Use: Every day    Substances: Nicotine   Substance and Sexual Activity    Alcohol use: Yes     Comment: rarely     Drug use: Not Currently    Sexual activity: None   Other Topics Concern    None   Social History Narrative    None     Social Determinants of Health     Financial Resource Strain:     Difficulty of Paying Living Expenses:    Food Insecurity:     Worried About Running Out of Food in the Last Year:     [employer] in the Last Year:    Transportation Needs:     Lack of Transportation (Medical):  Lack of Transportation (Non-Medical):    Physical Activity:     Days of Exercise per Week:     Minutes of Exercise per Session:    Stress:     Feeling of Stress :    Social Connections:     Frequency of Communication with Friends and Family:     Frequency of Social Gatherings with Friends and Family:     Attends Druze Services:     Active Member of Clubs or Organizations:     Attends Club or Organization Meetings:     Marital Status:    Intimate Partner Violence:     Fear of Current or Ex-Partner:     Emotionally Abused:     Physically Abused:     Sexually Abused:          Medications Prior to Admission:      Prior to Admission medications    Medication Sig Start Date End Date Taking? Authorizing Provider   Apremilast (OTEZLA) 30 MG TABS Take by mouth 2 times daily OTEZLA   Yes Historical Provider, MD   albuterol sulfate HFA (VENTOLIN HFA) 108 (90 Base) MCG/ACT inhaler Inhale 2 puffs into the lungs every 6 hours as needed for Wheezing   Yes Historical Provider, MD   ARIPiprazole (ABILIFY) 2 MG tablet Take 2 mg by mouth daily   Yes Historical Provider, MD   atenolol (TENORMIN) 25 MG tablet Take 25 mg by mouth as needed For anxiety   Yes Historical Provider, MD   butalbital-acetaminophen-caffeine (FIORICET, ESGIC) -40 MG per tablet Take 1 tablet by mouth every 4 hours as needed for Headaches   Yes Historical Provider, MD   celecoxib (CELEBREX) 100 MG capsule Take 100 mg by mouth 2 times daily   Yes Historical Provider, MD   fluticasone (FLONASE) 50 MCG/ACT nasal spray 1 spray by Each Nostril route daily   Yes Historical Provider, MD   gabapentin (NEURONTIN) 600 MG tablet Take 600 mg by mouth 4 times daily. Yes Historical Provider, MD   HYDROcodone-acetaminophen (NORCO) 5-325 MG per tablet Take 1 tablet by mouth every 6 hours as needed for Pain.    Yes Historical Provider, MD   hydrOXYzine (VISTARIL) 25 MG capsule Take 25 mg by mouth 3 times daily as needed for Itching   Yes Historical Provider, MD   loratadine (CLARITIN) 10 MG tablet Take 10 mg by mouth daily   Yes Historical Provider, MD   methocarbamol (ROBAXIN) 750 MG tablet Take 750 mg by mouth 4 times daily as needed    Yes Historical Provider, MD   morphine (MS CONTIN) 15 MG extended release tablet Take 15 mg by mouth 2 times daily. Yes Historical Provider, MD   sertraline (ZOLOFT) 100 MG tablet Take 100 mg by mouth daily   Yes Historical Provider, MD   NONFORMULARY Infuse 4,000 Units intravenously as needed Antihemophilic factor hum rec (Helixate FS IV)  Patient bring instructions for dosing. He used this at home as needed. Hematologist Dr Beverly Holland [phone]   Yes Historical Provider, MD         Allergies:  Aspirin    PHYSICAL EXAM:      /81   Pulse 69   Temp 98 °F (36.7 °C) (Oral)   Resp 16   Ht 5' 7\" (1.702 m)   Wt 161 lb (73 kg)   SpO2 97%   BMI 25.22 kg/m²      Airway:  Airway patent with no audible stridor    Heart:  Regular rate and rhythm, No murmur noted    Lungs:  No increased work of breathing, good air exchange, clear to auscultation bilaterally, no crackles or wheezing    Abdomen:  Soft, non-distended, non-tender, no masses palpated    ASSESSMENT AND PLAN    Patient is a 36 y.o. male with above specified procedure planned. 1.  The patients history and physical was obtained and signed off by the pre-admission testing department. Patient seen and focused exam done today- no new changes since last physical exam on 9/15/21    2. Access to ancillary services are available per request of the provider.     Heather Pierre, NEMO - CNP     9/23/2021

## 2021-09-24 VITALS
BODY MASS INDEX: 25.27 KG/M2 | HEIGHT: 67 IN | SYSTOLIC BLOOD PRESSURE: 105 MMHG | RESPIRATION RATE: 18 BRPM | DIASTOLIC BLOOD PRESSURE: 64 MMHG | OXYGEN SATURATION: 97 % | WEIGHT: 161 LBS | TEMPERATURE: 98.1 F | HEART RATE: 81 BPM

## 2021-09-24 PROCEDURE — 97162 PT EVAL MOD COMPLEX 30 MIN: CPT

## 2021-09-24 PROCEDURE — 6360000002 HC RX W HCPCS: Performed by: ORTHOPAEDIC SURGERY

## 2021-09-24 PROCEDURE — 97116 GAIT TRAINING THERAPY: CPT

## 2021-09-24 PROCEDURE — 6370000000 HC RX 637 (ALT 250 FOR IP): Performed by: ORTHOPAEDIC SURGERY

## 2021-09-24 PROCEDURE — 97165 OT EVAL LOW COMPLEX 30 MIN: CPT

## 2021-09-24 PROCEDURE — 97535 SELF CARE MNGMENT TRAINING: CPT

## 2021-09-24 PROCEDURE — 2580000003 HC RX 258: Performed by: ORTHOPAEDIC SURGERY

## 2021-09-24 PROCEDURE — G0378 HOSPITAL OBSERVATION PER HR: HCPCS

## 2021-09-24 RX ADMIN — ACETAMINOPHEN 650 MG: 325 TABLET ORAL at 01:30

## 2021-09-24 RX ADMIN — MORPHINE SULFATE 15 MG: 15 TABLET, FILM COATED, EXTENDED RELEASE ORAL at 09:55

## 2021-09-24 RX ADMIN — CEFAZOLIN 2000 MG: 10 INJECTION, POWDER, FOR SOLUTION INTRAVENOUS at 05:09

## 2021-09-24 RX ADMIN — BUTALBITAL, ACETAMINOPHEN, AND CAFFEINE 1 TABLET: 50; 325; 40 TABLET ORAL at 01:29

## 2021-09-24 RX ADMIN — Medication 10 ML: at 10:14

## 2021-09-24 RX ADMIN — ACETAMINOPHEN 650 MG: 325 TABLET ORAL at 06:45

## 2021-09-24 RX ADMIN — GABAPENTIN 600 MG: 600 TABLET, FILM COATED ORAL at 09:55

## 2021-09-24 RX ADMIN — SODIUM CHLORIDE 25 ML: 9 INJECTION, SOLUTION INTRAVENOUS at 05:06

## 2021-09-24 ASSESSMENT — PAIN SCALES - GENERAL
PAINLEVEL_OUTOF10: 5
PAINLEVEL_OUTOF10: 5
PAINLEVEL_OUTOF10: 0

## 2021-09-24 ASSESSMENT — PAIN DESCRIPTION - DESCRIPTORS: DESCRIPTORS: ACHING

## 2021-09-24 ASSESSMENT — PAIN - FUNCTIONAL ASSESSMENT: PAIN_FUNCTIONAL_ASSESSMENT: PREVENTS OR INTERFERES SOME ACTIVE ACTIVITIES AND ADLS

## 2021-09-24 ASSESSMENT — PAIN DESCRIPTION - ONSET: ONSET: ON-GOING

## 2021-09-24 ASSESSMENT — PAIN DESCRIPTION - LOCATION: LOCATION: LEG

## 2021-09-24 ASSESSMENT — PAIN DESCRIPTION - PROGRESSION: CLINICAL_PROGRESSION: NOT CHANGED

## 2021-09-24 ASSESSMENT — PAIN DESCRIPTION - FREQUENCY: FREQUENCY: CONTINUOUS

## 2021-09-24 ASSESSMENT — PAIN DESCRIPTION - PAIN TYPE: TYPE: ACUTE PAIN;SURGICAL PAIN

## 2021-09-24 ASSESSMENT — PAIN DESCRIPTION - ORIENTATION: ORIENTATION: LEFT;UPPER

## 2021-09-24 NOTE — PROGRESS NOTES
Patient seen POD#1    Comfortable. Block still in effect. Foot/DIgits pink & warm with good cap refill. Foot still numb. Dressing clean and dry with no strike through. A/P:  Doing well.   D/C this AM

## 2021-09-24 NOTE — PROGRESS NOTES
4 Eyes Admission Assessment     I agree as the admission nurse that 2 RN's have performed a thorough Head to Toe Skin Assessment on the patient. ALL assessment sites listed below have been assessed on admission. Areas assessed by both nurses:   [x]   Head, Face, and Ears   [x]   Shoulders, Back, and Chest  [x]   Arms, Elbows, and Hands   [x]   Coccyx, Sacrum, and Ischium  [x]   Legs, Feet, and Heels        Does the Patient have Skin Breakdown?   No         French Prevention initiated:  No   Wound Care Orders initiated:  No      [facility] nurse consulted for Pressure Injury (Stage 3,4, Unstageable, DTI, NWPT, and Complex wounds) or French score 18 or lower:  No      Nurse 1 eSignature: Electronically signed by Virginia Johnson RN on 9/24/21 at 5:01 AM EDT    **SHARE this note so that the co-signing nurse is able to place an eSignature**    Nurse 2 eSignature: Electronically signed by Chacha Jackson RN on 9/24/21 at 5:47 AM EDT

## 2021-09-24 NOTE — H&P
I was consulted to assist with hemophilia a management postoperatively. Patient has no preop or postop labs. Patient is well versed with his disease and self manages his factor replacement at home. He has been followed by Dr. Pancho Zimmer at [facility] in [place] since his diagnosis. Patient is being discharged this morning and he will continue his factor replacement as previously prescribed by Dr. Pancho Zimmer. I have nothing to add to his care at this point. Recommend preop labs prior to next surgery.     Ezekiel Fiore MD

## 2021-09-24 NOTE — PROGRESS NOTES
Physical Therapy    Facility/Department: [facility] 5T ORTHO/NEURO  Initial Assessment and treatment    NAME: Bossman Parkisnon  : 1980  MRN: [id number]    Date of Service: 2021    Discharge Recommendations:Bartolo Bowen scored a 23/24 on the AM-PAC short mobility form. Current research shows that an AM-PAC score of 18 or greater is typically associated with a discharge to the patient's home setting. Based on the patient's AM-PAC score and their current functional mobility deficits, it is recommended that the patient have 2-3 sessions per week of Physical Therapy at d/c to increase the patient's independence. At this time, this patient demonstrates the endurance and safety to discharge home without therapy assist and a follow up treatment frequency of 2-3x/wk. Please see assessment section for further patient specific details. If patient discharges prior to next session this note will serve as a discharge summary. Please see below for the latest assessment towards goals. PT Equipment Recommendations  Equipment Needed: No    Assessment   Assessment: The pt presents following L ankle surgery and is doing well with mobility. He did require minimal cueing to maintain NWB and was able to maintain it throughout mobility. He managed a flight of stairs without LOB with crutches. Pt has his own crutches and plans to discharge home today. Treatment Diagnosis: impaired mobility 2/2 L ankle sx  Prognosis: Good  Decision Making: Low Complexity  PT Education: Goals;PT Role;Plan of Care;General Safety; Functional Mobility Training;Transfer Training;Gait Training  REQUIRES PT FOLLOW UP: Yes  Activity Tolerance  Activity Tolerance: Patient Tolerated treatment well       Patient Diagnosis(es): There were no encounter diagnoses. has a past medical history of Anxiety, Arthritis, Chronic pain, Environmental allergies, Hemophilia ([facility] [address] Hepatitis C, Major depressive disorder, and Social phobia.    has a past surgical history that includes Ankle surgery (Bilateral); Elbow surgery (Left); [place] tooth extraction; liver biopsy; and Total ankle arthroplasty (Left, 9/23/2021). Restrictions  Position Activity Restriction  Other position/activity restrictions: up as tolerated  Vision/Hearing  Vision: Impaired  Vision Exceptions:  (wears contacts)  Hearing: Within functional limits     Subjective  General  Chart Reviewed: Yes  Patient assessed for rehabilitation services?: Yes  Additional Pertinent Hx: Pt presents s/p LEFT ANKLE REPLACEMENT; GASTROCNEMIUS RECESSION. Family / Caregiver Present: No  Referring Practitioner: Lenny Luque MD  Diagnosis: arthritis of L ankle  Follows Commands: Within Functional Limits  General Comment  Comments: The pt presents supine in bed and willing to work with therapists. Subjective  Subjective: \"I have had several other back surgeries so I am used to it. \"  Pain Screening  Patient Currently in Pain: Yes (6/10 pain in L ankle)  Vital Signs  Patient Currently in Pain: Yes (6/10 pain in L ankle)       Orientation  Orientation  Overall Orientation Status: Within Functional Limits  Social/Functional History  Social/Functional History  Lives With: Spouse, Son, Daughter (wife with kids 8 & 11)  Type of Home: House  Home Layout: Two level, Bed/Bath upstairs, 1/2 bath on main level (pt plans to stay in the finished basement where there is a full bathroom)  Home Access: Stairs to enter with rails  Entrance Stairs - Number of Steps: 3 w/ one side rail  Entrance Stairs - Rails: Left  Bathroom Shower/Tub: Tub/Shower unit  Bathroom Toilet: Standard  Bathroom Equipment: Hand-held shower  Bathroom Accessibility: Accessible  Home Equipment: Crutches, Cane (knee scooter)  ADL Assistance: Independent  Homemaking Assistance: Independent  Ambulation Assistance: Independent (used a cane prior to surgery for ambulation)  Transfer Assistance: Independent  Active : Yes  Mode of Transportation: Car  Occupation: Full time employment  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 Pt has wife and children around to help him as needed. Cognition   Cognition  Overall Cognitive Status: WNL    Objective  Strength RLE  Strength RLE: WFL  Strength LLE  Strength LLE: Exception  Comment: weak from surgery        Bed mobility  Supine to Sit: Independent (with HOB elevated)  Scooting: Independent  Transfers  Sit to Stand: Supervision (with crutches)  Stand to sit: Supervision  Ambulation  Ambulation?: Yes  Ambulation 1  Surface: level tile  Device: Axillary Crutches  Assistance: Supervision  Quality of Gait: Pt hopping and mostly maintaining NWB, occasionally setting his foot down occasionally with cueing from therapists to keep it up to ensure NWB.   Distance: 200'  Comments: Pt steady and without LOB  Stairs/Curb  Stairs?: Yes  Stairs  # Steps : 10  Stairs Height: 6\"  Rails:  (bilateral crutches on the way up and handrail and crutch on the way down)  Assistance: Contact guard assistance  Comment: pt able to maintain NWB     Balance  Sitting - Static: Good  Sitting - Dynamic: Good  Standing - Static: Good  Standing - Dynamic: Fair;+      Treatment:  Functional mobility training and pt education    Plan   Plan  Times per week: 7  Times per day: Daily  Current Treatment Recommendations: Strengthening, Transfer Training, Endurance Training, Neuromuscular Re-education, Cognitive Reorientation, Patient/Caregiver Education & Training, Balance Training, Gait Training, Functional Mobility Training, Safety Education & Training  Safety Devices  Type of devices: Call light within reach, Chair alarm in place, Left in chair, Nurse notified    AM-PAC Score  AM-PAC Inpatient Mobility Raw Score : 23 (09/24/21 1117)  AM-PAC Inpatient T-Scale Score : 56.93 (09/24/21 1117)  Mobility Inpatient CMS 0-100% Score: 11.2 (09/24/21 1117)  Mobility Inpatient CMS G-Code Modifier : CI (09/24/21 1117)    Therapy Time   Individual Concurrent Group Co-treatment   Time In 0850         Time Out 0925         Minutes 35         Timed Code Treatment Minutes: 35 Minutes    Timed Code Treatment Minutes:  35 Minutes    Total Treatment Minutes:  35 minutes      Vikki Steele, PT

## 2021-09-24 NOTE — PROGRESS NOTES
Occupational Therapy   Occupational Therapy Initial Assessment/Tx  Date: 2021   Patient Name: Kinsey Coello  MRN: [id number]     : 1980    Date of Service: 2021    Discharge Recommendations: Kinsey Coello scored a 21/24 on the AM-PAC ADL Inpatient form. Current research shows that an AM-PAC score of 18 or greater is typically associated with a discharge to the patient's home setting. At this time, this patient demonstrates the endurance and safety to discharge home with 24hr supervision. Please see assessment section for further patient specific details. If patient discharges prior to next session this note will serve as a discharge summary. Please see below for the latest assessment towards goals. OT Equipment Recommendations  Equipment Needed: Yes  Mobility Devices: ADL Assistive Devices  ADL Assistive Devices: Shower Chair with back    Assessment   Assessment: Pt is 37 yo male doing well POD #1 left ankle replacement. Pt is NWB on LLE and using crutches for mobility this date with supervision. Pt also performing ADLs with supervision-mod I. Recommend pt obtain shower chair for home setting. Pt already has recommended 24hr A from wife. No further acute OT needs and will sign off. Prognosis: Good  Decision Making: Low Complexity  OT Education: OT Role;Plan of Care;Precautions; ADL Adaptive Strategies; Equipment;Transfer Training  REQUIRES OT FOLLOW UP: No  Activity Tolerance  Activity Tolerance: Patient Tolerated treatment well  Safety Devices  Safety Devices in place: Yes  Type of devices: All fall risk precautions in place;Call light within reach; Chair alarm in place; Left in chair;Gait belt;Nurse notified           Patient Diagnosis(es): There were no encounter diagnoses. has a past medical history of Anxiety, Arthritis, Chronic pain, Environmental allergies, Hemophilia ([address] Hepatitis C, Major depressive disorder, and Social phobia.    has a past surgical history that includes Ankle AM-PAC Score        AM-PAC Inpatient Daily Activity Raw Score: 21 (09/24/21 1056)  AM-PAC Inpatient ADL T-Scale Score : 44.27 (09/24/21 1056)  ADL Inpatient CMS 0-100% Score: 32.79 (09/24/21 1056)  ADL Inpatient CMS G-Code Modifier : CJ (09/24/21 1056)    Goals          Therapy Time   Individual Concurrent Group Co-treatment   Time In 0850         Time Out 0925         Minutes 35         Timed Code Treatment Minutes: 20 Minutes (+ 15 min OT eval)       Teddy Bamberger, OT

## 2021-09-24 NOTE — RT PROTOCOL NOTE
RT Nebulizer Bronchodilator Protocol Note    There is a bronchodilator order in the chart from a provider indicating to follow the RT Bronchodilator Protocol and there is an Initiate RT Bronchodilator Protocol order as well (see protocol at bottom of note). CXR Findings:  No results found. The findings from the last RT Protocol Assessment were as follows:  Smoking: None or smoker <15 pack years  Respiratory Pattern: Regular pattern and RR 12-20 bpm  Breath Sounds: Clear breath sounds  Cough: Strong, spontaneous, non-productive  Indication for Bronchodilator Therapy: None  Bronchodilator Assessment Score: 0    Aerosolized bronchodilator medication orders have been revised according to the RT Nebulizer Bronchodilator Protocol below. Respiratory Therapist to perform RT Therapy Protocol Assessment initially then follow the protocol. Repeat RT Therapy Protocol Assessment PRN for score 0-3 or on second treatment, BID, and PRN for scores above 3. No Indications - adjust the frequency to every 6 hours PRN wheezing or bronchospasm, if no treatments needed after 48 hours then discontinue using Per Protocol order mode. If indication present, adjust the RT bronchodilator orders based on the Bronchodilator Assessment Score as indicated below. If a patient is on this medication at home then do not decrease Frequency below that used at home. 0-3 - enter or revise RT bronchodilator order(s) to equivalent RT Bronchodilator order with Frequency of every 4 hours PRN for wheezing or increased work of breathing using Per Protocol order mode. 4-6 - enter or revise RT Bronchodilator order(s) to two equivalent RT bronchodilator orders with one order with BID Frequency and one order with Frequency of every 4 hours PRN wheezing or increased work of breathing using Per Protocol order mode.          7-10 - enter or revise RT Bronchodilator order(s) to two equivalent RT bronchodilator orders with one order with TID Frequency and one order with Frequency of every 4 hours PRN wheezing or increased work of breathing using Per Protocol order mode. 11-13 - enter or revise RT Bronchodilator order(s) to one equivalent RT bronchodilator order with QID Frequency and an Albuterol order with Frequency of every 4 hours PRN wheezing or increased work of breathing using Per Protocol order mode. Greater than 13 - enter or revise RT Bronchodilator order(s) to one equivalent RT bronchodilator order with every 4 hours Frequency and an Albuterol order with Frequency of every 2 hours PRN wheezing or increased work of breathing using Per Protocol order mode. RT to enter RT Home Evaluation for COPD & MDI Assessment order using Per Protocol order mode.     Electronically signed by Ashtyn Boggs RCP on 9/23/2021 at 8:17 PM

## 2021-09-24 NOTE — PROGRESS NOTES
Patient admitted to PACU # 9 from OR at 1717 post LEFT ANKLE REPLACEMENT; GASTROCNEMIUS RECESSION - Left per Amelia Snow MD  .  Attached to PACU monitoring system and report received from anesthesia provider. Patient was reported to be hemodynamically stable during procedure. Patient drowsy on admission and denied pain. Pt arrived to PACU with splint in place on left ankle/foot - from just below knee to toes. Toes are warm, pink, cap refill is <3 seconds. Ice applied and foot elevated on pillow.

## 2021-09-24 NOTE — PLAN OF CARE
Problem: Falls - Risk of:  Goal: Will remain free from falls  Description: Will remain free from falls. 9/24/2021 0926 by Candie Olmstead RN  Outcome: Ongoing    Problem: Pain:  Goal: Pain level will decrease  Description: Pain level will decrease.   Outcome: Ongoing none

## 2021-09-24 NOTE — PROGRESS NOTES
Patient is alert and oriented x4. VSS. Patient is NWB on left ankle. Tolerates diet and fluids well. Patient ambulates to bathroom x1 assist with gait belt and walker. Bed in lowest position and call light within reach. Will continue to monitor.

## 2021-09-25 NOTE — OP NOTE
[facility] [address], [address]                                OPERATIVE REPORT    PATIENT NAME: Maria Teresa Haley                        :        1980  MED REC NO:   [id number]                          ROOM:       [id number]  ACCOUNT NO:   [de-identified]                           ADMIT DATE: 2021  PROVIDER:     Janelle Menezes. Harjeet Choudhury MD    DATE OF PROCEDURE:  2021    SURGEON:  Janelle Menezes. MD Gabriel    PREOPERATIVE DIAGNOSES:  Left,  1. Ankle arthritis. 2.  Equinus contracture. POSTOPERATIVE DIAGNOSES:  Left,  1. Ankle arthritis. 2.  Equinus contracture. OPERATIONS:  Left,  1. Total ankle replacement using Dev Talaris size 0 tibia-XL, size 0  talus, 8 mm polyethylene. 2.  Gastrocnemius recession. ANESTHETIC:  General with block. 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 The patient has had  intractable pain and has elected for surgical treatment. The risks and  potential benefits of the procedure were discussed with the patient. He  understands these, was given the opportunity to ask questions. His  questions answered to his satisfaction. He has given consent to proceed  with the above- outlined procedures. DESCRIPTION OF PROCEDURE:  The patient was brought to the operating  room, placed in the supine position on the operating table. After  induction of general anesthetic, a pneumatic tourniquet was placed  around the patient's left proximal thigh and set to 350 mmHg. The  patient's left leg was then prepped and draped free in the usual sterile  fashion. The extremity exsanguinated and the pneumatic tourniquet  inflated. Gastrocnemius recession. At this point, an intraoperative Silfverskiold  test was performed. This revealed a significant equinus contracture.    Incision was therefore made in line with the extremity directly over the  distal aponeurosis of the gastrocnemius muscle. Blunt dissection was  carried out through the subcutaneous and deeper tissue taking care to  identify and protect the sural nerve and lesser saphenous vein. These  structures were gently retracted laterally allowing exposure of the  aponeurosis of the gastrocnemius muscle. The aponeurosis was held under  tension by dorsiflexing the ankle and divided transversely under direct  visualization. This allowed about 3 cm lengthening, nicely correcting  the patient's equinus contracture. This wound was irrigated with a  sterile lavage solution and closed with 3-0 nylon suture. Total ankle replacement. At this point, an incision was made in line  with the extremity, centered over the ankle joint. Dissection was  carried out through the subcutaneous and deeper tissue taking care to  identify and protect the superficial peroneal nerve which was gently  retracted laterally. The extensor retinaculum was divided in line with  the axis of the extremity and the extensor hallucis longus and tibialis  anterior tendons were identified and their interval explored. Care was  taken to identify and protect the anterior tibial artery and deep  peroneal nerve which were gently retracted laterally. An anterior  capsulectomy was performed at this point as well as an anterior tibial  and superior talar subperiosteal dissection. End-stage arthritis was  noted of the ankle with essentially no remaining normal articular  cartilage. At this point, the tibial alignment guide was pinned in  place. This was done under fluoroscopic control. A small incision was  made over the tibial tubercle and blunt dissection carried out to the  anterior tibia. The proximal tibial guide pin was then applied and  matching the distal tibial articular surface in rotation as closely as  possible.   The tibial alignment guide was then placed neutral to the  axis of the tibia and applied distally, then pinned in place. Positioning was verified with the use of multiplanar fluoroscopy. The  tibial sizing guide was applied and a size 0 was noted to give the best  fit and rotation at this point was fine-tuned using the guide to match  the patient's natural talar rotation. Final adjustments were done and  resection was set to 9 mm. At this point, the size 0 tibial cutting  guide was applied distally. This was checked again with multiplanar  fluoroscopy, then the distal tibial, medial and lateral chamfer cuts  were made with the appropriate saw. The guide was removed and  extraneous bone was removed. This allowed good exposure of the talus. The talar dome had collapsed somewhat, and the patient had almost a  flat-top to his talus. For this reason, the decision was made to  proceed with a flat cut talar component. The ankle was held at neutral  varus-valgus and the superior articular surface and a portion of the  superior talar neck was cut with the ankle held at neutral dorsiflexion  using the talar cutting guide. This brought the superior talar surface  back to healthy-appearing cancellous bone. At this point, the size 0  tibial trial was placed with a size 0 talar trial and an 8-mm  polyethylene. These were noted to give very good fit and the talar  trial pinned in place once rotation was set by dorsiflexing and plantar  flexing the ankle. Positioning was finalized with multiplanar  fluoroscopy, and the tibial slot and barrel cut were made with the  appropriate drills and osteotomes, rasping the slot to the size 0. The  talar central drill hole and posterior fin were also cut using the drill  holes in the talar trial.  The gutters at this point were cleaned and  the posterior ankle cleaned.   The gutters were excised with a high-speed  handheld reciprocating saw under direct visualization and all posterior  osteophytes and bone fragments were removed from the ankle with a  pituitary loss was minimal due to use of a tourniquet.            Roger Garcia MD    D: 09/24/2021 17:26:16       T: 09/24/2021 17:28:55     [id number]  Job#: [id number]     Doc#: [id number]    CC:

## 2021-09-30 NOTE — DISCHARGE SUMMARY
[place] DISCHARGE SUMMARY         The patient was taken to the operating room on 9/23/2021 where the aforementioned procedure was preformed. The patient was taken to the post operative anesthesia recovery unit in stable condition. The patient was then transferred to the orthopaedic floor for post operative pain management and convalesce       The patient was followed medically in the hospital for the above surgical procedures performed and below medical issues during their hospital stay    Active Problems:    Arthritis of left ankle  Resolved Problems:    * No resolved hospital problems. *         (x )The patient is going to take his coagulation factors as directed by his hematologist       The patient was discharged in stable condition on 9/24/2021. Please see medical reconciliation for discharge medications. The discharge instructions were explained to the patient and the family. The patient will follow up in the office at his scheduled post-op appointments.

## 2022-07-06 ENCOUNTER — TELEPHONE (OUTPATIENT)
Dept: ORTHOPEDIC SURGERY | Age: 42
End: 2022-07-06

## 2022-07-06 NOTE — TELEPHONE ENCOUNTER
Orthopedic Nurse Navigator Summary    COVID:  Vaccinated     Patient Name:  Marylou Riley  Anticipated Date of Surgery:  07/19/22  Attended Pre-op Education Class:  Video sent to patient email  PCP: Alon Bui DO  Date of PCP visit for H&P: 07/13/22  Is patient in a Pain Management program:  Review of Medical history reveals history of: Hemophilia- sees Dr. Mohinder Jennings at [facility], nurse Anuradha Kingsley [phone], Anxiety, Arthritis,Chronic pain- fibromyalgia like, Asthma, Hepatitis C- 1991, Depression    Critical Lab Values  - Hemoglobin (g/dL):  Date: 07/02/22 Value 14.4  - Hematocrit(%): Date: 07/02/22  Value 40.7  - HgbA1C:  Date:  Value  - Albumin:  Date: 07/02/22  Value 4.1  - BUN:  Date: 07/02/22  Value 16  - Creatinine:  Date: 07/02/22  Value  0.9    Coronary Artery Disease/HTN/CHF history: No  Cardiologist: No  Cardiac clearance necessary:  No  Date of cardiac clearance appt:  Final Cardiac recommendations: On any anticoagulation: No    Diabetes History:  No  Most recent HgbA1C:  Pulmonary:  COPD/Emphysema/Use of home oxygen: Patient has asthma  Alcohol use: Social    BMI greater than 40 at time of scheduling: Additional medical concerns:   Additional recommendations for above concerns:  Attended Pre-Hab program:    Anticipated Discharge Disposition:  Home with OPT  Who will be with patient at home following discharge:  He lives with his parents so they will help provide care  Equipment patient already has:  crutches  Bedroom on first or second floor:  First   Bathroom on first or second floor:  First   Weight bearing status:  wbat  Pre-op ambulatory status: painful ambulation  Number of entry steps:  1  Caregiver assistance:  Full time    Consuelo Snyder RN  Date:  07/06/22

## 2022-08-30 ENCOUNTER — OFFICE (OUTPATIENT)
Dept: URBAN - METROPOLITAN AREA CLINIC 16 | Facility: CLINIC | Age: 42
End: 2022-08-30
Payer: COMMERCIAL

## 2022-08-30 VITALS
DIASTOLIC BLOOD PRESSURE: 82 MMHG | HEART RATE: 93 BPM | WEIGHT: 164 LBS | SYSTOLIC BLOOD PRESSURE: 124 MMHG | OXYGEN SATURATION: 97 % | HEIGHT: 67 IN

## 2022-08-30 DIAGNOSIS — B18.2 CHRONIC VIRAL HEPATITIS C: ICD-10-CM

## 2022-08-30 PROCEDURE — 99213 OFFICE O/P EST LOW 20 MIN: CPT | Performed by: NURSE PRACTITIONER

## 2022-09-29 LAB
CBC, PLATELET CT  AND  DIFF: ABS BASOPHIL: 0.1 K/UL
CBC, PLATELET CT  AND  DIFF: ABS EOSINOPHIL: 0.2 K/UL
CBC, PLATELET CT  AND  DIFF: ABS IMMATURE GRANS: 0 K/UL
CBC, PLATELET CT  AND  DIFF: ABS LYMPHOCYTE: 3.2 K/UL
CBC, PLATELET CT  AND  DIFF: ABS MONOCYTE: 0.7 K/UL
CBC, PLATELET CT  AND  DIFF: ABS NEUTROPHIL: 6.3 K/UL
CBC, PLATELET CT  AND  DIFF: BASOPHIL: 0.5 %
CBC, PLATELET CT  AND  DIFF: DIFFERENTIAL: (no result)
CBC, PLATELET CT  AND  DIFF: EOSINOPHIL: 1.5 %
CBC, PLATELET CT  AND  DIFF: HEMATOCRIT: 44.9 %
CBC, PLATELET CT  AND  DIFF: HEMOGLOBIN: 15.3 G/DL
CBC, PLATELET CT  AND  DIFF: IMMATURE GRANULOCYTES: 0.2 %
CBC, PLATELET CT  AND  DIFF: LYMPHOCYTE: 31 %
CBC, PLATELET CT  AND  DIFF: MCH: 32.8 PG
CBC, PLATELET CT  AND  DIFF: MCHC: 34.1 G/DL
CBC, PLATELET CT  AND  DIFF: MCV: 96.1 FL
CBC, PLATELET CT  AND  DIFF: MONOCYTE: 6.5 %
CBC, PLATELET CT  AND  DIFF: MPV: 12.7 FL — HIGH
CBC, PLATELET CT  AND  DIFF: NEUTROPHIL: 60.3 %
CBC, PLATELET CT  AND  DIFF: NRBCS: 0 /100 WBC
CBC, PLATELET CT  AND  DIFF: PLATELET COUNT: 165 K/UL
CBC, PLATELET CT  AND  DIFF: RBC: 4.67 M/UL
CBC, PLATELET CT  AND  DIFF: RDW: 11.9 %
CBC, PLATELET CT  AND  DIFF: WBC COUNT: 10.4 K/UL
COMPREHENSIVE METABOLIC PANEL: A/G RATIO: 2.3 RATIO
COMPREHENSIVE METABOLIC PANEL: ALBUMIN: 4.8 G/DL
COMPREHENSIVE METABOLIC PANEL: ALK PHOSPHATASE: 104 U/L
COMPREHENSIVE METABOLIC PANEL: ALT: 33 U/L
COMPREHENSIVE METABOLIC PANEL: AST: 21 U/L
COMPREHENSIVE METABOLIC PANEL: BILIRUBIN,TOTAL: 0.3 MG/DL
COMPREHENSIVE METABOLIC PANEL: BLOOD UREA NITROGEN: 11 MG/DL
COMPREHENSIVE METABOLIC PANEL: BUN/CREAT RATIO: 12
COMPREHENSIVE METABOLIC PANEL: CALCIUM: 9.5 MG/DL
COMPREHENSIVE METABOLIC PANEL: CHLORIDE: 104 MEQ/L
COMPREHENSIVE METABOLIC PANEL: CO2: 22 MEQ/L
COMPREHENSIVE METABOLIC PANEL: CREATININE: 0.9 MG/DL
COMPREHENSIVE METABOLIC PANEL: FASTING STATUS: (no result)
COMPREHENSIVE METABOLIC PANEL: GLOBULIN: 2.1 G/DL
COMPREHENSIVE METABOLIC PANEL: GLOMERULAR FILTRATION RATE (GFR): 110 MLS/MIN/1.73M2
COMPREHENSIVE METABOLIC PANEL: GLUCOSE,RANDOM: 89 MG/DL
COMPREHENSIVE METABOLIC PANEL: POTASSIUM: 3.9 MEQ/L
COMPREHENSIVE METABOLIC PANEL: SODIUM: 139 MEQ/L
COMPREHENSIVE METABOLIC PANEL: TOTAL PROTEIN: 6.9 G/DL
HCV VIRAL RNA, PCR: HCV RNA, PCR QN LOG: <1.18 LOG IU/ML
HCV VIRAL RNA, PCR: HCV RNA, PCR QN VIRAL LOAD: <15 IU/ML
HCV VIRAL RNA, PCR: HCV RNA, PCR, QUAL: NOT DETECTED

## 2022-10-03 ENCOUNTER — OFFICE (OUTPATIENT)
Dept: URBAN - METROPOLITAN AREA CLINIC 16 | Facility: CLINIC | Age: 42
End: 2022-10-03
Payer: COMMERCIAL

## 2022-10-03 VITALS
DIASTOLIC BLOOD PRESSURE: 84 MMHG | OXYGEN SATURATION: 97 % | WEIGHT: 164 LBS | HEIGHT: 67 IN | SYSTOLIC BLOOD PRESSURE: 122 MMHG | HEART RATE: 88 BPM

## 2022-10-03 DIAGNOSIS — B18.2 CHRONIC VIRAL HEPATITIS C: ICD-10-CM

## 2022-10-03 PROCEDURE — 99213 OFFICE O/P EST LOW 20 MIN: CPT | Performed by: NURSE PRACTITIONER

## 2022-11-21 ENCOUNTER — TELEPHONE (OUTPATIENT)
Dept: ORTHOPEDIC SURGERY | Age: 42
End: 2022-11-21

## 2022-11-21 NOTE — TELEPHONE ENCOUNTER
Orthopedic Nurse Navigator Summary    COVID:  Vaccinated    Patient Name:  Pop Cardenas  Anticipated Date of Surgery:  12/07/22  Attended Pre-op Education Class:  Video sent to patient email  PCP: Alma Stiles DO  Date of PCP visit for H&P: 11/15/22  Is patient in a Pain Management program:  Review of Medical history reveals history of: Hemophilia, Asthma, Hepatitis C, Chronic pain, GERD, Mild reactive airway disease    Critical Lab Values  - Hemoglobin (g/dL):  Date: 11/04/22 Value 15.2  - Hematocrit(%): Date: 11/4/22  Value 43.4  - HgbA1C:  Date: 11/15/22  Value 5.4  - Albumin:  Date:  11/15/22 Value 4.7  - BUN:  Date:  11/15/22  Value 16  - Creatinine:  Date: 11/15/22  Value 1.05    Coronary Artery Disease/HTN/CHF history: No  Cardiologist: No  Cardiac clearance necessary:    Date of cardiac clearance appt:  Final Cardiac recommendations: On any anticoagulation: No    Diabetes History:  No  Most recent HgbA1C: 5.4  Pulmonary:  COPD/Emphysema/Use of home oxygen: Patient has reactive airway disease  Alcohol use: Yes    BMI greater than 40 at time of scheduling: Additional medical concerns:   Additional recommendations for above concerns:  Attended Pre-Hab program:    Anticipated Discharge Disposition:  Home with OPT  Who will be with patient at home following discharge:  He lives with his parents  Equipment patient already has:  crutches  Bedroom on first or second floor:  first  Bathroom on first or second floor:  first  Weight bearing status:  wbat  Pre-op ambulatory status: painful ambulation  Number of entry steps:  1  Caregiver assistance:  full time    Simone Antonio RN  Date:   11/21/22

## 2022-11-30 NOTE — PROGRESS NOTES
11/30/22 @ [address] [facility] Petey RN ([phone]) FROM [facility] CALLED TO CONFIRM ONCOLOGY ORDERS REC'D FOR 12/7 SURGERY. STATES MEDICATION IS TO BE TRANSFERRED FROM [facility] TO [address] [place] [address]. ORDERS ON CHART IN MEDIA TAB. NOTE SENT TO PHARMACY MANAGER, SDS MANAGER AND COORDINATOR TO ENSURE ALL AWARE OF ORDER AND MED TRANSFER.  [address] [facility] IF ANY QUESTIONS TO CALL HER BACK /AG

## 2022-12-06 ENCOUNTER — ANESTHESIA EVENT (OUTPATIENT)
Dept: OPERATING ROOM | Age: 42
End: 2022-12-06
Payer: COMMERCIAL

## 2022-12-06 RX ORDER — OLANZAPINE 5 MG/1
TABLET ORAL
COMMUNITY
Start: 2022-09-21

## 2022-12-06 RX ORDER — OMEPRAZOLE 40 MG/1
CAPSULE, DELAYED RELEASE ORAL
COMMUNITY
Start: 2022-10-24

## 2022-12-06 RX ORDER — HYDROCODONE BITARTRATE AND ACETAMINOPHEN 7.5; 325 MG/1; MG/1
TABLET ORAL 3 TIMES DAILY PRN
COMMUNITY
Start: 2022-11-10

## 2022-12-06 RX ORDER — LIDOCAINE 50 MG/G
1 PATCH TOPICAL
COMMUNITY
Start: 2021-12-08

## 2022-12-06 NOTE — PROGRESS NOTES
Place patient label inside box (if no patient label, complete below)  Name:  :  MR#:   Elenora Blizzard / PROCEDURE  I (we)Amy (Patient Name) authorize Kailey Vega MD (Provider / Susan Alcocer) and/or such assistants as may be selected by him/her, to perform the following operation/procedure(s): ANTERIOR APPROACH RIGHT TOTAL HIP ARTHROPLASTY        Note: If unable to obtain consent prior to an emergent procedure, document the emergent reason in the medical record. This procedure has been explained to my (our) satisfaction and included in the explanation was: The intended benefit, nature, and extent of the procedure to be performed; The significant risks involved and the probability of success; Alternative procedures and methods of treatment; The dangers and probable consequences of such alternatives (including no procedure or treatment); The expected consequences of the procedure on my future health; Whether other qualified individuals would be performing important surgical tasks and/or whether  would be present to advise or support the procedure. I (we) understand that there are other risks of infection and other serious complications in the pre-operative/procedural and postoperative/procedural stages of my (our) care. I (we) have asked all of the questions which I (we) thought were important in deciding whether or not to undergo treatment or diagnosis. These questions have been answered to my (our) satisfaction. I (we) understand that no assurance can be given that the procedure will be a success, and no guarantee or warranty of success has been given to me (us). It has been explained to me (us) that during the course of the operation/procedure, unforeseen conditions may be revealed that necessitate extension of the original procedure(s) or different procedure(s) than those set forth in Paragraph 1.  I (we) authorize and request that the above-named physician, his/her assistants or his/her designees, perform procedures as necessary and desirable if deemed to be in my (our) best interest.     Revised 8/2/2021                                                                          Page 1 of 2             I acknowledge that health care personnel may be observing this procedure for the purpose of medical education or other specified purposes as may be necessary as requested and/or approved by my (our) physician. I (we) consent to the disposal by the hospital Pathologist of the removed tissue, parts or organs in accordance with hospital policy. I do ____ do not ____ consent to the use of a local infiltration pain blocking agent that will be used by my provider/surgical provider to help alleviate pain during my procedure. I do ____ do not ____ consent to an emergent blood transfusion in the case of a life-threatening situation that requires blood components to be administered. This consent is valid for 24 hours from the beginning of the procedure. This patient does ____ or does not ____ currently have a DNR status/order. If DNR order is in place, obtain Addendum to the Surgical Consent for ALL Patients with a DNR Order to address yfn-operative status for limited intervention or DNR suspension.      I have read and fully understand the above Consent for Operation/Procedure and that all blanks were completed before I signed the consent.   _____________________________       _____________________      ____/____am/pm  Signature of Patient or legal representative      Printed Name / Relationship            Date / Time   ____________________________       _____________________      ____/____am/pm  Witness to Signature                                    Printed Name                    Date / Time    If patient is unable to sign or is a minor, complete the following)  Patient is a minor, ____ years of age, or unable to sign

## 2022-12-06 NOTE — PROGRESS NOTES
[facility] PRE-SURGICAL TESTING INSTRUCTIONS                      PRIOR TO PROCEDURE DATE:    1. PLEASE FOLLOW ANY INSTRUCTIONS GIVEN TO YOU PER YOUR SURGEON. 2. Arrange for someone to drive you home and be with you for the first 24 hours after discharge for your safety after your procedure for which you received sedation. Ensure it is someone we can share information with regarding your discharge. NOTE: At this time ONLY 2 ADULTS may accompany you   One person MUST stay at hospital entire time if outpatient surgery      3. You must contact your surgeon for instructions IF:  You are taking any blood thinners, aspirin, anti-inflammatory or vitamins. There is a change in your physical condition such as a cold, fever, rash, cuts, sores, or any other infection, especially near your surgical site. 4. Do not drink alcohol the day before or day of your procedure. Do not use any recreational marijuana at least 24 hours or street drugs (heroin, cocaine) at minimum 5 days prior to your procedure. 5. A Pre-Surgical History and Physical MUST be completed WITHIN 30 DAYS OR LESS prior to your procedure. by your Physician or an Urgent Care        THE DAY OF YOUR PROCEDURE:  1. Follow instructions for ARRIVAL TIME as DIRECTED BY YOUR SURGEON. 2. Enter the MAIN entrance from [address] and follow the signs to the [place] (by [employer]) or [employer] (offered free of charge 7 am-5pm). 3. Enter the Main Entrance of the hospital (do not enter from the lower level of the parking garage). Upon entrance, check in with the  at the surgical information desk on your LEFT. Bring your insurance card and photo ID to register      4. DO NOT EAT ANYTHING 8 hours prior to arrival for surgery. You may have up to 8 ounces of water 4 hours prior to your arrival for surgery.    NOTE: ALL Gastric, Bariatric & Bowel surgery patients - you MUST follow your surgeon's instructions regarding eating/ drinking as you will have very specific instructions to follow. If you did not receive these, call your surgeon's office immediately. 5. MEDICATIONS:  Take the following medications with a SMALL sip of water: Norco,Neurontin,omeprazole  Use your usual dose of inhalers the morning of surgery. BRING your rescue inhaler with you to hospital.   Anesthesia does NOT want you to take insulin the morning of surgery. They will control your blood sugar while you are at the hospital. Please contact your ordering physician for instructions regarding your insulin the night before your procedure. If you have an insulin pump, please keep it set on basal rate. Bariatric patient's call your surgeon if on diabetic medications as some may need to be stopped 1 week prior to surgery    6. Do not swallow additional water when brushing teeth. No gum, candy, mints, or ice chips. Refrain from smoking or at least decrease the amount on day of surgery. 7. Morning of surgery:   Take a shower with an antibacterial soap (i.e., Safeguard or Dial) OR your physician may have instructed you to use Hibiclens. Dress in loose, comfortable clothing appropriate for redressing after your procedure. Do not wear jewelry (including body piercings), make-up (especially NO eye make-up), fingernail polish (NO toenail polish if foot/leg surgery), lotion, powders, or metal hairclips. Do not shave or wax for 72 hours prior to procedure near your operative site. Shaving with a razor can irritate your skin and make it easier to develop an infection. On the day of your procedure, any hair that needs to be removed near the surgical site will be 'clipped' by a healthcare worker using a special clipper designed to avoid skin irritation. 8. Dentures, glasses, or contacts will need to be removed before your procedure. Bring cases for your glasses, contacts, dentures, or hearing aids to protect them while you are in surgery.       9. If you use a CPAP, please bring it with you on the day of your procedure. 10. We recommend that valuable personal belongings such as cash, cell phones, e-tablets, or jewelry, be left at home during your stay. The hospital will not be responsible for valuables that are not secured in the hospital safe. However, if your insurance requires a co-pay, you may want to bring a method of payment, i.e., Check or credit card, if you wish to pay your co-pay the day of surgery. 11. If you are to stay overnight, you may bring a bag with personal items. Please have any large items you may need brought in by your family after your arrival to your hospital room. 12. If you have a Living Will or Durable Power of , please bring a copy on the day of your procedure. How we keep you safe and work to prevent surgical site infections:   1. Health care workers should always check your ID bracelet to verify your name and birth date. You will be asked many times to state your name, date of birth, and allergies. 2. Health care workers should always clean their hands with soap or alcohol gel before providing care to you. It is okay to ask anyone if they cleaned their hands before they touch you. 3. You will be actively involved in verifying the type of procedure you are having and ensuring the correct surgical site. This will be confirmed multiple times prior to your procedure. Do NOT jamie your surgery site UNLESS instructed to by your surgeon. 4. When you are in the operating room, your surgical site will be cleansed with a special soap, and in most cases, you will be given an antibiotic before the surgery begins. What to expect AFTER your procedure? 1. Immediately following your procedure, your will be taken to the PACU for the first phase of your recovery. Your nurse will help you recover from any potential side effects of anesthesia, such as extreme drowsiness, changes in your vital signs or breathing patterns.  Nausea, headache, muscle aches, or sore throat may also occur after anesthesia. Your nurse will help you manage these potential side effects. 2. For comfort and safety, arrange to have someone at home with you for the first 24 hours after discharge. 3. You and your family will be given written instructions about your diet, activity, dressing care, medications, and return visits. 4. Once at home, should issues with nausea, pain, or bleeding occur, or should you notice any signs of infection, you should call your surgeon. 5. Always clean your hands before and after caring for your wound. Do not let your family touch your surgery site without cleaning their hands. 6. Narcotic pain medications can cause significant constipation. You may want to add a stool softener to your postoperative medication schedule or speak to your surgeon on how best to manage this SIDE EFFECT. SPECIAL INSTRUCTIONS  bring albuterol inhaler    Thank you for allowing us to care for you. We strive to exceed your expectations in the delivery of care and service provided to you and your family. If you need to contact the [place] [address] staff for any reason, please call us at [phone]    Instructions reviewed with patient during preadmission testing phone interview.   Mamta Martins RN.12/6/2022 .10:47 AM      ADDITIONAL EDUCATIONAL INFORMATION REVIEWED PER PHONE WITH YOU AND/OR YOUR FAMILY:  Yes Hibiclens® Bathing Instructions   Yes Antibacterial Soap

## 2022-12-06 NOTE — PROGRESS NOTES
12/6/22 @ 8884  I spoke with [facility] in [facility] [facility] and they have special medication ordered for pt  (Kogenate). Also see further orders for pt after surgery with Kogenate. Dr Miguelina Mcgill is aware and of medication prior to surgery  and also has orders for after surgery. Dr Miguelina Mcgill aware of labs.   Leda Pierre

## 2022-12-07 ENCOUNTER — APPOINTMENT (OUTPATIENT)
Dept: GENERAL RADIOLOGY | Age: 42
End: 2022-12-07
Attending: ORTHOPAEDIC SURGERY
Payer: COMMERCIAL

## 2022-12-07 ENCOUNTER — HOSPITAL ENCOUNTER (OUTPATIENT)
Age: 42
Setting detail: OBSERVATION
Discharge: HOME | End: 2022-12-08
Attending: ORTHOPAEDIC SURGERY | Admitting: ORTHOPAEDIC SURGERY
Payer: COMMERCIAL

## 2022-12-07 ENCOUNTER — ANESTHESIA (OUTPATIENT)
Dept: OPERATING ROOM | Age: 42
End: 2022-12-07
Payer: COMMERCIAL

## 2022-12-07 PROBLEM — M16.11 OSTEOARTHRITIS OF RIGHT HIP, UNSPECIFIED OSTEOARTHRITIS TYPE: Status: ACTIVE | Noted: 2022-12-07

## 2022-12-07 LAB
ABO/RH: NORMAL
ALBUMIN SERPL-MCNC: 4.2 G/DL (ref 3.4–5)
ANION GAP SERPL CALCULATED.3IONS-SCNC: 12 MMOL/L (ref 3–16)
ANTIBODY SCREEN: NORMAL
BUN BLDV-MCNC: 13 MG/DL (ref 7–20)
CALCIUM SERPL-MCNC: 8.9 MG/DL (ref 8.3–10.6)
CHLORIDE BLD-SCNC: 102 MMOL/L (ref 99–110)
CO2: 22 MMOL/L (ref 21–32)
CREAT SERPL-MCNC: 0.9 MG/DL (ref 0.9–1.3)
GFR SERPL CREATININE-BSD FRML MDRD: >60 ML/MIN/{1.73_M2}
GLUCOSE BLD-MCNC: 123 MG/DL (ref 70–99)
GLUCOSE BLD-MCNC: 131 MG/DL (ref 70–99)
PERFORMED ON: ABNORMAL
PHOSPHORUS: 2.6 MG/DL (ref 2.5–4.9)
POTASSIUM SERPL-SCNC: 4.3 MMOL/L (ref 3.5–5.1)
SODIUM BLD-SCNC: 136 MMOL/L (ref 136–145)

## 2022-12-07 PROCEDURE — 3600000014 HC SURGERY LEVEL 4 ADDTL 15MIN: Performed by: ORTHOPAEDIC SURGERY

## 2022-12-07 PROCEDURE — 3700000000 HC ANESTHESIA ATTENDED CARE: Performed by: ORTHOPAEDIC SURGERY

## 2022-12-07 PROCEDURE — 2580000003 HC RX 258: Performed by: PHYSICIAN ASSISTANT

## 2022-12-07 PROCEDURE — 2500000003 HC RX 250 WO HCPCS: Performed by: ORTHOPAEDIC SURGERY

## 2022-12-07 PROCEDURE — C1776 JOINT DEVICE (IMPLANTABLE): HCPCS | Performed by: ORTHOPAEDIC SURGERY

## 2022-12-07 PROCEDURE — 6370000000 HC RX 637 (ALT 250 FOR IP): Performed by: ORTHOPAEDIC SURGERY

## 2022-12-07 PROCEDURE — A4217 STERILE WATER/SALINE, 500 ML: HCPCS | Performed by: ORTHOPAEDIC SURGERY

## 2022-12-07 PROCEDURE — G0378 HOSPITAL OBSERVATION PER HR: HCPCS

## 2022-12-07 PROCEDURE — 86850 RBC ANTIBODY SCREEN: CPT

## 2022-12-07 PROCEDURE — 3700000001 HC ADD 15 MINUTES (ANESTHESIA): Performed by: ORTHOPAEDIC SURGERY

## 2022-12-07 PROCEDURE — 3600000004 HC SURGERY LEVEL 4 BASE: Performed by: ORTHOPAEDIC SURGERY

## 2022-12-07 PROCEDURE — 6360000002 HC RX W HCPCS: Performed by: NURSE ANESTHETIST, CERTIFIED REGISTERED

## 2022-12-07 PROCEDURE — 6360000002 HC RX W HCPCS: Performed by: PHYSICIAN ASSISTANT

## 2022-12-07 PROCEDURE — 2720000010 HC SURG SUPPLY STERILE: Performed by: ORTHOPAEDIC SURGERY

## 2022-12-07 PROCEDURE — 86901 BLOOD TYPING SEROLOGIC RH(D): CPT

## 2022-12-07 PROCEDURE — 2580000003 HC RX 258: Performed by: ORTHOPAEDIC SURGERY

## 2022-12-07 PROCEDURE — 6370000000 HC RX 637 (ALT 250 FOR IP): Performed by: PHYSICIAN ASSISTANT

## 2022-12-07 PROCEDURE — 72170 X-RAY EXAM OF PELVIS: CPT

## 2022-12-07 PROCEDURE — 6360000002 HC RX W HCPCS: Performed by: ANESTHESIOLOGY

## 2022-12-07 PROCEDURE — 2709999900 HC NON-CHARGEABLE SUPPLY: Performed by: ORTHOPAEDIC SURGERY

## 2022-12-07 PROCEDURE — 6360000002 HC RX W HCPCS: Performed by: ORTHOPAEDIC SURGERY

## 2022-12-07 PROCEDURE — 3209999900 FLUORO FOR SURGICAL PROCEDURES

## 2022-12-07 PROCEDURE — 6360000002 HC RX W HCPCS: Performed by: INTERNAL MEDICINE

## 2022-12-07 PROCEDURE — 86900 BLOOD TYPING SEROLOGIC ABO: CPT

## 2022-12-07 PROCEDURE — 7100000001 HC PACU RECOVERY - ADDTL 15 MIN: Performed by: ORTHOPAEDIC SURGERY

## 2022-12-07 PROCEDURE — 2580000003 HC RX 258: Performed by: ANESTHESIOLOGY

## 2022-12-07 PROCEDURE — 36415 COLL VENOUS BLD VENIPUNCTURE: CPT

## 2022-12-07 PROCEDURE — 94150 VITAL CAPACITY TEST: CPT

## 2022-12-07 PROCEDURE — 6370000000 HC RX 637 (ALT 250 FOR IP): Performed by: ANESTHESIOLOGY

## 2022-12-07 PROCEDURE — 80069 RENAL FUNCTION PANEL: CPT

## 2022-12-07 PROCEDURE — 73502 X-RAY EXAM HIP UNI 2-3 VIEWS: CPT

## 2022-12-07 PROCEDURE — 2500000003 HC RX 250 WO HCPCS: Performed by: NURSE ANESTHETIST, CERTIFIED REGISTERED

## 2022-12-07 PROCEDURE — 7100000000 HC PACU RECOVERY - FIRST 15 MIN: Performed by: ORTHOPAEDIC SURGERY

## 2022-12-07 DEVICE — R3 20 DEGREE XLPE ACETABULAR LINER                                    36MM INNER DIAMETER X OUTER DIAMETER 52MM
Type: IMPLANTABLE DEVICE | Site: HIP | Status: FUNCTIONAL
Brand: R3

## 2022-12-07 DEVICE — OXINIUM FEMORAL HEAD 12/14 TAPER                                    36 MM +0
Type: IMPLANTABLE DEVICE | Site: HIP | Status: FUNCTIONAL
Brand: OXINIUM

## 2022-12-07 DEVICE — POLARSTEM STANDARD NON-CEMENTED                                    WITH TI/HA 0
Type: IMPLANTABLE DEVICE | Site: HIP | Status: FUNCTIONAL
Brand: POLARSTEM

## 2022-12-07 DEVICE — R3 3 HOLE ACETABULAR SHELL 52MM
Type: IMPLANTABLE DEVICE | Site: HIP | Status: FUNCTIONAL
Brand: R3 ACETABULAR

## 2022-12-07 DEVICE — HIP H2 TOT ADV OTHER HD IMPL CAPPED H2 SN: Type: IMPLANTABLE DEVICE | Site: HIP | Status: FUNCTIONAL

## 2022-12-07 RX ORDER — SODIUM CHLORIDE, SODIUM LACTATE, POTASSIUM CHLORIDE, CALCIUM CHLORIDE 600; 310; 30; 20 MG/100ML; MG/100ML; MG/100ML; MG/100ML
INJECTION, SOLUTION INTRAVENOUS CONTINUOUS
Status: DISCONTINUED | OUTPATIENT
Start: 2022-12-07 | End: 2022-12-07 | Stop reason: HOSPADM

## 2022-12-07 RX ORDER — OXYCODONE HCL 10 MG/1
10 TABLET, FILM COATED, EXTENDED RELEASE ORAL ONCE
Status: COMPLETED | OUTPATIENT
Start: 2022-12-07 | End: 2022-12-07

## 2022-12-07 RX ORDER — MAGNESIUM HYDROXIDE 1200 MG/15ML
LIQUID ORAL CONTINUOUS PRN
Status: DISCONTINUED | OUTPATIENT
Start: 2022-12-07 | End: 2022-12-07 | Stop reason: HOSPADM

## 2022-12-07 RX ORDER — HYDRALAZINE HYDROCHLORIDE 20 MG/ML
10 INJECTION INTRAMUSCULAR; INTRAVENOUS
Status: DISCONTINUED | OUTPATIENT
Start: 2022-12-07 | End: 2022-12-07 | Stop reason: HOSPADM

## 2022-12-07 RX ORDER — DEXAMETHASONE SODIUM PHOSPHATE 4 MG/ML
10 INJECTION, SOLUTION INTRA-ARTICULAR; INTRALESIONAL; INTRAMUSCULAR; INTRAVENOUS; SOFT TISSUE ONCE
Status: COMPLETED | OUTPATIENT
Start: 2022-12-07 | End: 2022-12-07

## 2022-12-07 RX ORDER — OXYCODONE HYDROCHLORIDE 5 MG/1
5 TABLET ORAL EVERY 4 HOURS PRN
Status: DISCONTINUED | OUTPATIENT
Start: 2022-12-07 | End: 2022-12-07

## 2022-12-07 RX ORDER — SODIUM CHLORIDE 0.9 % (FLUSH) 0.9 %
5-40 SYRINGE (ML) INJECTION PRN
Status: DISCONTINUED | OUTPATIENT
Start: 2022-12-07 | End: 2022-12-08 | Stop reason: HOSPADM

## 2022-12-07 RX ORDER — ATENOLOL 25 MG/1
25 TABLET ORAL DAILY
Status: DISCONTINUED | OUTPATIENT
Start: 2022-12-08 | End: 2022-12-08 | Stop reason: HOSPADM

## 2022-12-07 RX ORDER — ROCURONIUM BROMIDE 10 MG/ML
INJECTION, SOLUTION INTRAVENOUS PRN
Status: DISCONTINUED | OUTPATIENT
Start: 2022-12-07 | End: 2022-12-07 | Stop reason: SDUPTHER

## 2022-12-07 RX ORDER — OXYCODONE HYDROCHLORIDE 5 MG/1
5 TABLET ORAL
Status: COMPLETED | OUTPATIENT
Start: 2022-12-07 | End: 2022-12-07

## 2022-12-07 RX ORDER — LIDOCAINE HYDROCHLORIDE 20 MG/ML
INJECTION, SOLUTION EPIDURAL; INFILTRATION; INTRACAUDAL; PERINEURAL PRN
Status: DISCONTINUED | OUTPATIENT
Start: 2022-12-07 | End: 2022-12-07 | Stop reason: SDUPTHER

## 2022-12-07 RX ORDER — MORPHINE SULFATE 4 MG/ML
2 INJECTION, SOLUTION INTRAMUSCULAR; INTRAVENOUS EVERY 5 MIN PRN
Status: DISCONTINUED | OUTPATIENT
Start: 2022-12-07 | End: 2022-12-07 | Stop reason: SDUPTHER

## 2022-12-07 RX ORDER — ACETAMINOPHEN 325 MG/1
650 TABLET ORAL EVERY 6 HOURS
Status: DISCONTINUED | OUTPATIENT
Start: 2022-12-07 | End: 2022-12-08 | Stop reason: HOSPADM

## 2022-12-07 RX ORDER — PANTOPRAZOLE SODIUM 40 MG/1
40 TABLET, DELAYED RELEASE ORAL
Status: DISCONTINUED | OUTPATIENT
Start: 2022-12-08 | End: 2022-12-08 | Stop reason: HOSPADM

## 2022-12-07 RX ORDER — ONDANSETRON 2 MG/ML
4 INJECTION INTRAMUSCULAR; INTRAVENOUS
Status: DISCONTINUED | OUTPATIENT
Start: 2022-12-07 | End: 2022-12-07 | Stop reason: HOSPADM

## 2022-12-07 RX ORDER — MORPHINE SULFATE 2 MG/ML
2 INJECTION, SOLUTION INTRAMUSCULAR; INTRAVENOUS
Status: DISCONTINUED | OUTPATIENT
Start: 2022-12-07 | End: 2022-12-08 | Stop reason: HOSPADM

## 2022-12-07 RX ORDER — LABETALOL HYDROCHLORIDE 5 MG/ML
10 INJECTION, SOLUTION INTRAVENOUS
Status: DISCONTINUED | OUTPATIENT
Start: 2022-12-07 | End: 2022-12-07 | Stop reason: HOSPADM

## 2022-12-07 RX ORDER — PROCHLORPERAZINE EDISYLATE 5 MG/ML
5 INJECTION INTRAMUSCULAR; INTRAVENOUS
Status: DISCONTINUED | OUTPATIENT
Start: 2022-12-07 | End: 2022-12-07 | Stop reason: HOSPADM

## 2022-12-07 RX ORDER — SODIUM CHLORIDE 9 MG/ML
INJECTION, SOLUTION INTRAVENOUS PRN
Status: DISCONTINUED | OUTPATIENT
Start: 2022-12-07 | End: 2022-12-08 | Stop reason: HOSPADM

## 2022-12-07 RX ORDER — OXYCODONE HYDROCHLORIDE 5 MG/1
10 TABLET ORAL EVERY 4 HOURS PRN
Status: DISCONTINUED | OUTPATIENT
Start: 2022-12-07 | End: 2022-12-08 | Stop reason: HOSPADM

## 2022-12-07 RX ORDER — PROPOFOL 10 MG/ML
INJECTION, EMULSION INTRAVENOUS PRN
Status: DISCONTINUED | OUTPATIENT
Start: 2022-12-07 | End: 2022-12-07 | Stop reason: SDUPTHER

## 2022-12-07 RX ORDER — SODIUM CHLORIDE 9 MG/ML
INJECTION, SOLUTION INTRAVENOUS CONTINUOUS
Status: DISCONTINUED | OUTPATIENT
Start: 2022-12-07 | End: 2022-12-08 | Stop reason: HOSPADM

## 2022-12-07 RX ORDER — HYDROXYZINE PAMOATE 25 MG/1
25 CAPSULE ORAL DAILY PRN
Status: DISCONTINUED | OUTPATIENT
Start: 2022-12-07 | End: 2022-12-08 | Stop reason: HOSPADM

## 2022-12-07 RX ORDER — SODIUM CHLORIDE 0.9 % (FLUSH) 0.9 %
5-40 SYRINGE (ML) INJECTION PRN
Status: DISCONTINUED | OUTPATIENT
Start: 2022-12-07 | End: 2022-12-07 | Stop reason: HOSPADM

## 2022-12-07 RX ORDER — MORPHINE SULFATE 2 MG/ML
4 INJECTION, SOLUTION INTRAMUSCULAR; INTRAVENOUS
Status: DISCONTINUED | OUTPATIENT
Start: 2022-12-07 | End: 2022-12-08 | Stop reason: HOSPADM

## 2022-12-07 RX ORDER — ACETAMINOPHEN 500 MG
1000 TABLET ORAL ONCE
Status: COMPLETED | OUTPATIENT
Start: 2022-12-07 | End: 2022-12-07

## 2022-12-07 RX ORDER — MIDAZOLAM HYDROCHLORIDE 1 MG/ML
INJECTION INTRAMUSCULAR; INTRAVENOUS PRN
Status: DISCONTINUED | OUTPATIENT
Start: 2022-12-07 | End: 2022-12-07 | Stop reason: SDUPTHER

## 2022-12-07 RX ORDER — OXYCODONE HYDROCHLORIDE 5 MG/1
5 TABLET ORAL EVERY 4 HOURS PRN
Status: DISCONTINUED | OUTPATIENT
Start: 2022-12-07 | End: 2022-12-08 | Stop reason: HOSPADM

## 2022-12-07 RX ORDER — FENTANYL CITRATE 50 UG/ML
25 INJECTION, SOLUTION INTRAMUSCULAR; INTRAVENOUS EVERY 5 MIN PRN
Status: DISCONTINUED | OUTPATIENT
Start: 2022-12-07 | End: 2022-12-07 | Stop reason: HOSPADM

## 2022-12-07 RX ORDER — BUPIVACAINE HYDROCHLORIDE 2.5 MG/ML
INJECTION, SOLUTION EPIDURAL; INFILTRATION; INTRACAUDAL PRN
Status: DISCONTINUED | OUTPATIENT
Start: 2022-12-07 | End: 2022-12-07 | Stop reason: HOSPADM

## 2022-12-07 RX ORDER — GABAPENTIN 600 MG/1
600 TABLET ORAL 4 TIMES DAILY
Status: DISCONTINUED | OUTPATIENT
Start: 2022-12-07 | End: 2022-12-08 | Stop reason: HOSPADM

## 2022-12-07 RX ORDER — HYDROMORPHONE HCL 110MG/55ML
PATIENT CONTROLLED ANALGESIA SYRINGE INTRAVENOUS PRN
Status: DISCONTINUED | OUTPATIENT
Start: 2022-12-07 | End: 2022-12-07 | Stop reason: SDUPTHER

## 2022-12-07 RX ORDER — SODIUM CHLORIDE 0.9 % (FLUSH) 0.9 %
5-40 SYRINGE (ML) INJECTION EVERY 12 HOURS SCHEDULED
Status: DISCONTINUED | OUTPATIENT
Start: 2022-12-07 | End: 2022-12-08 | Stop reason: HOSPADM

## 2022-12-07 RX ORDER — FENTANYL CITRATE 50 UG/ML
INJECTION, SOLUTION INTRAMUSCULAR; INTRAVENOUS PRN
Status: DISCONTINUED | OUTPATIENT
Start: 2022-12-07 | End: 2022-12-07 | Stop reason: SDUPTHER

## 2022-12-07 RX ORDER — OLANZAPINE 5 MG/1
5 TABLET ORAL NIGHTLY
Status: DISCONTINUED | OUTPATIENT
Start: 2022-12-07 | End: 2022-12-08 | Stop reason: HOSPADM

## 2022-12-07 RX ORDER — SODIUM CHLORIDE 9 MG/ML
INJECTION, SOLUTION INTRAVENOUS PRN
Status: DISCONTINUED | OUTPATIENT
Start: 2022-12-07 | End: 2022-12-07 | Stop reason: HOSPADM

## 2022-12-07 RX ORDER — METHOCARBAMOL 750 MG/1
750 TABLET, FILM COATED ORAL 4 TIMES DAILY PRN
Status: DISCONTINUED | OUTPATIENT
Start: 2022-12-07 | End: 2022-12-08 | Stop reason: HOSPADM

## 2022-12-07 RX ORDER — OXYCODONE HYDROCHLORIDE 5 MG/1
5 TABLET ORAL ONCE
Status: COMPLETED | OUTPATIENT
Start: 2022-12-07 | End: 2022-12-07

## 2022-12-07 RX ORDER — EPHEDRINE SULFATE 50 MG/ML
INJECTION INTRAVENOUS PRN
Status: DISCONTINUED | OUTPATIENT
Start: 2022-12-07 | End: 2022-12-07 | Stop reason: SDUPTHER

## 2022-12-07 RX ORDER — SODIUM CHLORIDE 0.9 % (FLUSH) 0.9 %
5-40 SYRINGE (ML) INJECTION EVERY 12 HOURS SCHEDULED
Status: DISCONTINUED | OUTPATIENT
Start: 2022-12-07 | End: 2022-12-07 | Stop reason: HOSPADM

## 2022-12-07 RX ORDER — MEPERIDINE HYDROCHLORIDE 25 MG/ML
12.5 INJECTION INTRAMUSCULAR; INTRAVENOUS; SUBCUTANEOUS PRN
Status: COMPLETED | OUTPATIENT
Start: 2022-12-07 | End: 2022-12-07

## 2022-12-07 RX ADMIN — HYDROMORPHONE HYDROCHLORIDE 0.5 MG: 1 INJECTION, SOLUTION INTRAMUSCULAR; INTRAVENOUS; SUBCUTANEOUS at 13:32

## 2022-12-07 RX ADMIN — SODIUM CHLORIDE: 9 INJECTION, SOLUTION INTRAVENOUS at 20:36

## 2022-12-07 RX ADMIN — TRANEXAMIC ACID 1000 MG: 1 INJECTION, SOLUTION INTRAVENOUS at 10:48

## 2022-12-07 RX ADMIN — MORPHINE SULFATE 2 MG: 4 INJECTION INTRAVENOUS at 13:06

## 2022-12-07 RX ADMIN — MORPHINE SULFATE 4 MG: 2 INJECTION, SOLUTION INTRAMUSCULAR; INTRAVENOUS at 19:51

## 2022-12-07 RX ADMIN — ROCURONIUM BROMIDE 20 MG: 10 INJECTION INTRAVENOUS at 11:45

## 2022-12-07 RX ADMIN — MORPHINE SULFATE 2 MG: 4 INJECTION INTRAVENOUS at 13:11

## 2022-12-07 RX ADMIN — FENTANYL CITRATE 50 MCG: 50 INJECTION, SOLUTION INTRAMUSCULAR; INTRAVENOUS at 11:14

## 2022-12-07 RX ADMIN — LIDOCAINE HYDROCHLORIDE 50 MG: 20 INJECTION, SOLUTION EPIDURAL; INFILTRATION; INTRACAUDAL; PERINEURAL at 10:33

## 2022-12-07 RX ADMIN — CEFAZOLIN 2000 MG: 2 INJECTION, POWDER, FOR SOLUTION INTRAMUSCULAR; INTRAVENOUS at 10:39

## 2022-12-07 RX ADMIN — OXYCODONE 5 MG: 5 TABLET ORAL at 16:37

## 2022-12-07 RX ADMIN — OXYCODONE 5 MG: 5 TABLET ORAL at 15:54

## 2022-12-07 RX ADMIN — SODIUM CHLORIDE, PRESERVATIVE FREE 5 ML: 5 INJECTION INTRAVENOUS at 19:51

## 2022-12-07 RX ADMIN — PROPOFOL 200 MG: 10 INJECTION, EMULSION INTRAVENOUS at 10:33

## 2022-12-07 RX ADMIN — METHOCARBAMOL 1000 MG: 100 INJECTION, SOLUTION INTRAMUSCULAR; INTRAVENOUS at 14:12

## 2022-12-07 RX ADMIN — HYDROMORPHONE HYDROCHLORIDE 1 MG: 2 INJECTION, SOLUTION INTRAMUSCULAR; INTRAVENOUS; SUBCUTANEOUS at 12:16

## 2022-12-07 RX ADMIN — MORPHINE SULFATE 4 MG: 2 INJECTION, SOLUTION INTRAMUSCULAR; INTRAVENOUS at 21:56

## 2022-12-07 RX ADMIN — ACETAMINOPHEN 1000 MG: 500 TABLET ORAL at 09:15

## 2022-12-07 RX ADMIN — EPHEDRINE SULFATE 10 MG: 50 INJECTION INTRAVENOUS at 11:30

## 2022-12-07 RX ADMIN — OXYCODONE 10 MG: 5 TABLET ORAL at 21:20

## 2022-12-07 RX ADMIN — FENTANYL CITRATE 50 MCG: 50 INJECTION, SOLUTION INTRAMUSCULAR; INTRAVENOUS at 10:33

## 2022-12-07 RX ADMIN — MORPHINE SULFATE 2 MG: 4 INJECTION INTRAVENOUS at 13:24

## 2022-12-07 RX ADMIN — HYDROMORPHONE HYDROCHLORIDE 1 MG: 2 INJECTION, SOLUTION INTRAMUSCULAR; INTRAVENOUS; SUBCUTANEOUS at 11:17

## 2022-12-07 RX ADMIN — CEFAZOLIN 2000 MG: 2 INJECTION, POWDER, FOR SOLUTION INTRAMUSCULAR; INTRAVENOUS at 20:37

## 2022-12-07 RX ADMIN — HYDROMORPHONE HYDROCHLORIDE 1 MG: 1 INJECTION, SOLUTION INTRAMUSCULAR; INTRAVENOUS; SUBCUTANEOUS at 23:39

## 2022-12-07 RX ADMIN — MEPERIDINE HYDROCHLORIDE 12.5 MG: 25 INJECTION INTRAMUSCULAR; INTRAVENOUS; SUBCUTANEOUS at 13:01

## 2022-12-07 RX ADMIN — ACETAMINOPHEN 650 MG: 325 TABLET, FILM COATED ORAL at 20:34

## 2022-12-07 RX ADMIN — SUGAMMADEX 200 MG: 100 INJECTION, SOLUTION INTRAVENOUS at 12:04

## 2022-12-07 RX ADMIN — MIDAZOLAM HYDROCHLORIDE 2 MG: 2 INJECTION, SOLUTION INTRAMUSCULAR; INTRAVENOUS at 10:28

## 2022-12-07 RX ADMIN — VANCOMYCIN HYDROCHLORIDE 1250 MG: 10 INJECTION, POWDER, LYOPHILIZED, FOR SOLUTION INTRAVENOUS at 10:47

## 2022-12-07 RX ADMIN — OLANZAPINE 5 MG: 5 TABLET, FILM COATED ORAL at 20:34

## 2022-12-07 RX ADMIN — HYDROMORPHONE HYDROCHLORIDE 0.5 MG: 1 INJECTION, SOLUTION INTRAMUSCULAR; INTRAVENOUS; SUBCUTANEOUS at 14:02

## 2022-12-07 RX ADMIN — EPHEDRINE SULFATE 5 MG: 50 INJECTION INTRAVENOUS at 11:28

## 2022-12-07 RX ADMIN — GABAPENTIN 600 MG: 600 TABLET, FILM COATED ORAL at 20:35

## 2022-12-07 RX ADMIN — DEXAMETHASONE SODIUM PHOSPHATE 10 MG: 4 INJECTION, SOLUTION INTRAMUSCULAR; INTRAVENOUS at 09:15

## 2022-12-07 RX ADMIN — METHOCARBAMOL 750 MG: 750 TABLET ORAL at 20:35

## 2022-12-07 RX ADMIN — OXYCODONE HYDROCHLORIDE 10 MG: 10 TABLET, FILM COATED, EXTENDED RELEASE ORAL at 09:15

## 2022-12-07 RX ADMIN — SODIUM CHLORIDE, POTASSIUM CHLORIDE, SODIUM LACTATE AND CALCIUM CHLORIDE: 600; 310; 30; 20 INJECTION, SOLUTION INTRAVENOUS at 09:15

## 2022-12-07 RX ADMIN — ROCURONIUM BROMIDE 50 MG: 10 INJECTION INTRAVENOUS at 10:34

## 2022-12-07 RX ADMIN — MEPERIDINE HYDROCHLORIDE 12.5 MG: 25 INJECTION INTRAMUSCULAR; INTRAVENOUS; SUBCUTANEOUS at 12:56

## 2022-12-07 RX ADMIN — MORPHINE SULFATE 2 MG: 4 INJECTION INTRAVENOUS at 13:18

## 2022-12-07 ASSESSMENT — PAIN DESCRIPTION - DESCRIPTORS
DESCRIPTORS: ACHING

## 2022-12-07 ASSESSMENT — PAIN DESCRIPTION - FREQUENCY
FREQUENCY: CONTINUOUS

## 2022-12-07 ASSESSMENT — PAIN DESCRIPTION - ORIENTATION
ORIENTATION: RIGHT

## 2022-12-07 ASSESSMENT — PAIN SCALES - GENERAL
PAINLEVEL_OUTOF10: 7
PAINLEVEL_OUTOF10: 9
PAINLEVEL_OUTOF10: 8
PAINLEVEL_OUTOF10: 7
PAINLEVEL_OUTOF10: 0
PAINLEVEL_OUTOF10: 8
PAINLEVEL_OUTOF10: 7
PAINLEVEL_OUTOF10: 5
PAINLEVEL_OUTOF10: 8
PAINLEVEL_OUTOF10: 8
PAINLEVEL_OUTOF10: 7
PAINLEVEL_OUTOF10: 6
PAINLEVEL_OUTOF10: 8

## 2022-12-07 ASSESSMENT — PAIN - FUNCTIONAL ASSESSMENT
PAIN_FUNCTIONAL_ASSESSMENT: 0-10
PAIN_FUNCTIONAL_ASSESSMENT: PREVENTS OR INTERFERES SOME ACTIVE ACTIVITIES AND ADLS

## 2022-12-07 ASSESSMENT — PAIN DESCRIPTION - ONSET
ONSET: ON-GOING

## 2022-12-07 ASSESSMENT — PAIN DESCRIPTION - LOCATION
LOCATION: HIP

## 2022-12-07 ASSESSMENT — PAIN DESCRIPTION - PAIN TYPE
TYPE: SURGICAL PAIN

## 2022-12-07 ASSESSMENT — LIFESTYLE VARIABLES: SMOKING_STATUS: 1

## 2022-12-07 NOTE — PROGRESS NOTES
Admitted to pacu at this time. Eyes open and oriented to person. VSS on monitor. Report given by CRNA. Shivering with warm blankets applied.

## 2022-12-07 NOTE — PROGRESS NOTES
Pt came back to pacu at 1811. Pt states nausea has improved and no pain at this time. Pt is not diaphoretic. BS was checked and was 113. Pt NSR on monitor. VS as charted. Pt surgical drsg c/d/I. Will continue to monitor.

## 2022-12-07 NOTE — PROGRESS NOTES
Spoke with Dr. Aakash Schrader who states no epidural. Pt will have general anesthesia. Also confirmed with Dr. Aakash Schrader that second IV would be appropriate since pt is high risk with hemophilia. Advised pt that second IV would be beneficial. Pt agreed and new IV placed in RFA. Saline locked.

## 2022-12-07 NOTE — ANESTHESIA PRE PROCEDURE
Department of Anesthesiology  Preprocedure Note       Name:  Burt Vega   Age:  43 y.o.  :  1980                                          MRN:  [id number]         Date:  2022      Surgeon: Damian Olsen):  Lia Acosta MD    Procedure: Procedure(s):  ANTERIOR APPROACH RIGHT TOTAL HIP ARTHROPLASTY    Medications prior to admission:   Prior to Admission medications    Medication Sig Start Date End Date Taking? Authorizing Provider   antihemophilic factor, recombinant, (KOGENATE) 3000 units KIT injection Infuse 3,970 Units intravenously once for 1 dose 22  Lia Acosta MD   antihemophilic factor, recombinant, (KOGENATE) 3000 units KIT injection Infuse 3,970 Units intravenously once for 1 dose 22  Lia Acosta MD   HYDROcodone-acetaminophen (NORCO) 7.5-325 MG per tablet 3 times daily as needed. 11/10/22   Historical Provider, MD   lidocaine (LIDODERM) 5 % Place 1 patch onto the skin 21   Historical Provider, MD   OLANZapine (ZYPREXA) 5 MG tablet TAKE 1 TABLET AT BEDTIME ORAL DAILY 22   Historical Provider, MD   omeprazole (PRILOSEC) 40 MG delayed release capsule TAKE 1 CAPSULE (40 MG TOTAL) BY MOUTH DAILY.  10/24/22   Historical Provider, MD   Apremilast (OTEZLA) 30 MG TABS Take by mouth 2 times daily OTEZLA    Historical Provider, MD   albuterol sulfate HFA (PROVENTIL;VENTOLIN;PROAIR) 108 (90 Base) MCG/ACT inhaler Inhale 2 puffs into the lungs every 6 hours as needed for Wheezing    Historical Provider, MD   atenolol (TENORMIN) 25 MG tablet Take 25 mg by mouth as needed For anxiety    Historical Provider, MD   butalbital-acetaminophen-caffeine (FIORICET, ESGIC) -40 MG per tablet Take 1 tablet by mouth every 4 hours as needed for Headaches    Historical Provider, MD   celecoxib (CELEBREX) 100 MG capsule Take 100 mg by mouth 2 times daily  Patient not taking: Reported on 2022    Historical Provider, MD   fluticasone (FLONASE) 50 MCG/ACT nasal spray 1 spray by Each Nostril route daily    Historical Provider, MD   gabapentin (NEURONTIN) 600 MG tablet Take 600 mg by mouth 4 times daily. Historical Provider, MD   hydrOXYzine (VISTARIL) 25 MG capsule Take 25 mg by mouth 3 times daily as needed for Itching    Historical Provider, MD   loratadine (CLARITIN) 10 MG tablet Take 10 mg by mouth daily    Historical Provider, MD   methocarbamol (ROBAXIN) 750 MG tablet Take 750 mg by mouth 4 times daily as needed     Historical Provider, MD   sertraline (ZOLOFT) 100 MG tablet Take 150 mg by mouth daily    Historical Provider, MD   NONFORMULARY Infuse 4,000 Units intravenously as needed Antihemophilic factor hum rec (Helixate FS IV)  Patient bring instructions for dosing. He used this at home as needed. Hematologist Dr Yury Wakefield [phone]    Historical Provider, MD       Current medications:    No current facility-administered medications for this visit.      Current Outpatient Medications   Medication Sig Dispense Refill    antihemophilic factor, recombinant, (KOGENATE) 3000 units KIT injection Infuse 3,970 Units intravenously once for 1 dose 2 each 0    antihemophilic factor, recombinant, (KOGENATE) 3000 units KIT injection Infuse 3,970 Units intravenously once for 1 dose 2 each 0     Facility-Administered Medications Ordered in Other Visits   Medication Dose Route Frequency Provider Last Rate Last Admin    lactated ringers infusion   IntraVENous Continuous Shirlene Ion, DO        lactated ringers infusion   IntraVENous Continuous Juve Oneil  mL/hr at 12/07/22 0915 New Bag at 12/07/22 0915    ceFAZolin (ANCEF) 2,000 mg in sodium chloride 0.9 % 50 mL IVPB (mini-bag)  2,000 mg IntraVENous Once Juve Oneil MD        vancomycin (VANCOCIN) 1,250 mg in dextrose 5 % 250 mL IVPB  15 mg/kg IntraVENous Once Juve Oneil MD        tranexamic acid (CYKLOKAPRON) 1,000 mg in sodium chloride 0.9 % 60 mL IVPB  1,000 mg IntraVENous Once Sonny Johnson Bob Humphries MD        antihemophilic factor (recombinant) (KOGENATE) injection 4,396 Units (Patient Supplied)  4,396 Units IntraVENous Once Priscilla Berman MD       Elliot Landaverde ON 49/7/6217] antihemophilic factor (recombinant) (KOGENATE) injection 4,396 Units  4,396 Units IntraVENous Daily Priscilla Berman MD        antihemophilic factor (recombinant) (KOGENATE) injection 2,198 Units  (Patient Supplied)  2,198 Units IntraVENous Once Priscilla Berman MD           Allergies: Allergies   Allergen Reactions    Nsaids      Due to hemophillia    Aspirin Other (See Comments)       Due to hemophilia   Avoids due to bleeding disorder. 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[address]     A    Hepatitis C     History of blood transfusion     Major depressive disorder     Migraines     Psoriasis     Social phobia        Past Surgical History:        Procedure Laterality Date    ANKLE SURGERY Bilateral     X2 on each side     ELBOW SURGERY Left     JOINT REPLACEMENT      ANKLE    LIVER BIOPSY      TOTAL ANKLE ARTHROPLASTY Left 9/23/2021    LEFT ANKLE REPLACEMENT; GASTROCNEMIUS RECESSION performed by Aisha Bernal MD at [address] EXTRACTION         Social History:    Social History     Tobacco Use    Smoking status: Every Day     Packs/day: 1.00     Types: Cigarettes    Smokeless tobacco: Never   Substance Use Topics    Alcohol use: Yes     Comment: rarely                                 Ready to quit: Not Answered  Counseling given: Not Answered      Vital Signs (Current): There were no vitals filed for this visit.                                            BP Readings from Last 3 Encounters:   12/07/22 99/64   09/24/21 105/64   09/23/21 (!) 97/54       NPO Status: BMI:   Wt Readings from Last 3 Encounters:   12/07/22 161 lb 9.6 oz (73.3 kg)   09/23/21 161 lb (73 kg)     There is no height or weight on file to calculate BMI.    CBC: No results found for: WBC, RBC, HGB, HCT, MCV, RDW, PLT    CMP: No results found for: NA, K, CL, CO2, BUN, CREATININE, GFRAA, AGRATIO, LABGLOM, GLUCOSE, GLU, PROT, CALCIUM, BILITOT, ALKPHOS, AST, ALT    POC Tests:   Recent Labs     12/07/22  0907   POCGLU 123*       Coags: No results found for: PROTIME, INR, APTT    HCG (If Applicable): No results found for: PREGTESTUR, PREGSERUM, HCG, HCGQUANT     ABGs: No results found for: PHART, PO2ART, JTR6QVQ, ONG6UTT, BEART, [id number]     Type & Screen (If Applicable):  No results found for: LABABO, LABRH    Drug/Infectious Status (If Applicable):  No results found for: HIV, HEPCAB    COVID-19 Screening (If Applicable): No results found for: COVID19        Anesthesia Evaluation  Patient summary reviewed and Nursing notes reviewed no history of anesthetic complications:   Airway: Mallampati: II  TM distance: >3 FB   Neck ROM: full  Mouth opening: > = 3 FB   Dental: normal exam         Pulmonary: breath sounds clear to auscultation  (+) asthma: current smoker (vapes    and 2-3 cig/ )                          ROS comment: Had covid Nov 2020 resolved      Cardiovascular:  Exercise tolerance: good (>4 METS),       (-) past MI    NYHA Classification: II    Rhythm: regular  Rate: normal           Beta Blocker:  Not on Beta Blocker         Neuro/Psych:   (+) headaches: migraine headaches,             GI/Hepatic/Renal:   (+) hepatitis (hep c since 1989  due to ddavp  tx ): C,           Endo/Other:    (+) blood dyscrasia::., .                  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          Other Findings:             Anesthesia Plan      general     ASA 3       Induction: intravenous. MIPS: Prophylactic antiemetics administered. Anesthetic plan and risks discussed with patient. Plan discussed with CRNA.     Attending anesthesiologist reviewed and agrees with Preprocedure content                Ankita Bailey DO   12/7/2022

## 2022-12-07 NOTE — OP NOTE
PREOPERATIVE DIAGNOSIS: Right hip arthritis. POSTOPERATIVE DIAGNOSIS: Right hip arthritis. OPERATION PERFORMED: Right total hip arthroplasty, direct anterior supine under  fluoroscopic guidance. ATTENDING SURGEON: Anitha Eli MD    FIRST SURGICAL ASSISTANT: Lorin Hampton, [facility]. The Physician Assistant was necessary to perform the surgery today by assisting with application of implants and manipulation of the extremity. This help was not otherwise available in the operating room today. ANESTHESIA: General plus 40 cc of 0.25% Bupivacaine Periarticular Injection     ESTIMATED BLOOD LOSS: 200 mL. INTRAVENOUS FLUIDS: 1200 mL crystalloid. URINE OUTPUT: 0.    ANTIBIOTICS: 2G Cefazolin + 1G Vancomycin     COMPLICATIONS: None. IMPLANTS:   1. Schwab [employer]hew R3 52 mm outer diameter acetabular shell. 2. R3 XLPE polyethylene acetabular liner, 36 mm inner diameter 20 degree. 3. Schwab and Nephew Polar femoral stem 0 standard. 4. Oxinium femoral head 36 mm outer diameter +0 offset     OPERATIVE INDICATIONS: This is a patient with longstanding  hip pain. They had radiographic evidence of hip arthritis. They were initially managed with nonoperative measures. After failing non-operative management, total hip arthroplasty was discussed with the patient. We discussed the risk adherent  to the anterior approach including but not limited to injury from on the Neon  table, anterior rather than posterior dislocation, damage to the anterior  structures. General risk of total hip arthroplasty including to dislocation,  neurovascular damage, infection, need for further surgery or even loss of life  or limb were discussed. Despite these risks and others, the patient elected  to proceed. OPERATIVE DETAILS: The patient was greeted in the preoperative holding area. The operative hip was marked with a marker. They were brought to the operating room  where general anesthesia was obtained.  They were placed on the Gordon table with feet in the boots and appropriately positioned against the post. The arms  were placed on the arm boards. All bony prominences were well padded. The anterior portion of the hip was prepped and draped in normal standard sterile surgical fashion. A final timeout was performed, verifying correct patient, operative site and  operative plan. The patient did receive antibiotics prior to surgical incision. They also received 1 g of tranexamic acid prior to surgical incision. I began by making an incision just lateral and distal to the anterior superior  iliac spine heading just laterally towards the lateral patellar. This was made through the skin through the subcutaneous  tissue, identifying the fascia overlying the tensor fascia esequiel. The fascia  was divided in line with its fibers. I placed an Allis clamp on the fascia  anteriorly and dissected over top of the tensor fascia esequiel, meeting the  sartorial fascia and then diving over top of the femoral neck. A cobra  retractor was placed medially over the neck and then laterally over the neck. I brought x-ray in, verified my position. I then divided through the fascia  overlying the anterior hip, coagulating all bleeders including the circumflex  vessel. I made a capsulotomy in a T-type fashion and tagged them with #1  Vicryl for retraction and later closure. I placed my cobra retractors  intraarticularly, then placed a Bovie jamie on the femoral neck for my planned  femoral osteotomy, placed the saw in place at the Bovie jamie, and brought  fluoroscopy in and verified my position. I made my osteotomy and then using a  corkscrew, was able to remove the femoral head. At this point, I externally  rotated the femur, dropping it into extension and adduction, and performed a  release of pubofemoral ligament inferiorly down to the lesser trochanter.  I  brought it back up into a neutral position and I placed 2 retractors  anteriorly and posteriorly for acetabular reaming. Under direct fluoroscopic  guidance, I reamed sequentially to a size 52 and then opened a 52 mm shell and impacted it into place. I grabbed this with a Kocher clamp and was able to clearly  demonstrate that it had excellent fixation and I did not feel it needed screw  fixation. A 36 mm inner diameter liner was opened and impacted into place. I  was pleased with this and I turned my attention to the femur. I placed the hook for the Jasper table just superior to the vastus lateralis underneath the  femur, and then elevated it and clamped it into the table. A retractor was  placed over the medial femur, the leg was brought into once again extension  and adduction, and full external rotation to approximately 110 degrees. I was  able to expose the femur. I then released the capsule superiorly in order to  get full femoral exposure, I used the box osteotome, followed by canal finder  and placed a broach into the femur and brought the femur back  up removing my retractors and took a fluoroscopic image to verify that I was  within the canal. I was pleased with this. I reset my exposure and then I  broached with sequentially to a size 0 stem. I then came down, I trialed this under fluoroscopic guidance. I had good leg length and I opened  the 0 STD stem. I reset my retraction and my position of my femur. I impacted  the stem to the level of my femoral neck osteotomy. After trialing once again I opened the size +0 head. This was impacted onto a dry Hannon  taper. I reduced the hip, took fluoroscopic images of the bilateral hips and  the pelvis to verify my leg length. I was pleased with leg length and offset. I dropped the hip into extension and external rotation and it did not  dislocate on the table. I was pleased with this and brought it back up into  neutral, took all traction off.     The wound was soaked in a dilute betadine solution followed by pulsatile irrigation with 3 L of sterile saline with bacitracin. I then turned my attention to closure of the wound. The fascia of the TFL was closed with interrupted 0 vicryl oversewn with a running #2 stratafix suture. A local anesthetic mixture was injected into the muscle, deep and subcutaneous tissue followed by 0 vicryl in the subcutaneous tissue. 2-0 vicryl was placed in a buried interrupted fashion followed by a running subcuticular 4-0 monocryl in the subdermal layer. Surgical adhesive was placed on the incision. A sterile silver impregnated occlusive was placed over the incision. The patient was extubated, transferred to a hospital bed and transported to the post-operative anesthesia care unit in stable condition. All sponge and needle counts were correct x 2.

## 2022-12-07 NOTE — PROGRESS NOTES
Father in room with patient. Admission completed. Call light within reach. All questioned answered. Pt reports no needs at this time. LR infusing in IV in LFA. Encouraged pt to call for needs.

## 2022-12-07 NOTE — H&P
[facility] Update H & P  Department of General Surgery   Surgical Service   Pre-operative History and Physical  Last H & P within the last 30 days. DIAGNOSIS:   Primary osteoarthritis of right hip [M16.11]    Procedure(s):  ANTERIOR APPROACH RIGHT TOTAL HIP ARTHROPLASTY    History obtained from: Patient interview and EHR     HISTORY OF PRESENT ILLNESS:   Patient is a 43 y.o. male with chronic right hip pain and limited ROM in the setting of arthrosis. The symptoms have been recalcitrant to conservative treatment and the patient presents today for the above procedure. Illness Screening: Patient denies fever, chills, worsening cough, or close contact with sick individuals. Past Medical History:        Diagnosis Date    Anxiety     Arthritis     Asthma     Chronic pain     Environmental allergies     Hemophilia ([address]     A    Hepatitis C     History of blood transfusion     Major depressive disorder     Migraines     Psoriasis     Social phobia      Past Surgical History:        Procedure Laterality Date    ANKLE SURGERY Bilateral     X2 on each side     ELBOW SURGERY Left     JOINT REPLACEMENT      ANKLE    LIVER BIOPSY      TOTAL ANKLE ARTHROPLASTY Left 9/23/2021    LEFT ANKLE REPLACEMENT; GASTROCNEMIUS RECESSION performed by Manav Garcias MD at [address] [place] [address]           Medications Prior to Admission:      Prior to Admission medications    Medication Sig Start Date End Date Taking?  Authorizing Provider   antihemophilic factor, recombinant, (KOGENATE) 3000 units KIT injection Infuse 3,970 Units intravenously once for 1 dose 12/7/22 12/7/22 Yes Adrianne Pelletier MD   antihemophilic factor, recombinant, (KOGENATE) 3000 units KIT injection Infuse 3,970 Units intravenously once for 1 dose 12/7/22 12/7/22 Yes Adrianne Pelletier MD   lidocaine (LIDODERM) 5 % Place 1 patch onto the skin 12/8/21  Yes Historical Provider, MD HYDROcodone-acetaminophen (NORCO) 7.5-325 MG per tablet 3 times daily as needed. 11/10/22   Historical Provider, MD   OLANZapine (ZYPREXA) 5 MG tablet TAKE 1 TABLET AT BEDTIME ORAL DAILY 9/21/22   Historical Provider, MD   omeprazole (PRILOSEC) 40 MG delayed release capsule TAKE 1 CAPSULE (40 MG TOTAL) BY MOUTH DAILY. 10/24/22   Historical Provider, MD   Apremilast (OTEZLA) 30 MG TABS Take by mouth 2 times daily OTEZLA    Historical Provider, MD   albuterol sulfate HFA (PROVENTIL;VENTOLIN;PROAIR) 108 (90 Base) MCG/ACT inhaler Inhale 2 puffs into the lungs every 6 hours as needed for Wheezing    Historical Provider, MD   atenolol (TENORMIN) 25 MG tablet Take 25 mg by mouth as needed For anxiety    Historical Provider, MD   butalbital-acetaminophen-caffeine (FIORICET, ESGIC) -40 MG per tablet Take 1 tablet by mouth every 4 hours as needed for Headaches    Historical Provider, MD   celecoxib (CELEBREX) 100 MG capsule Take 100 mg by mouth 2 times daily  Patient not taking: Reported on 12/7/2022    Historical Provider, MD   fluticasone (FLONASE) 50 MCG/ACT nasal spray 1 spray by Each Nostril route daily    Historical Provider, MD   gabapentin (NEURONTIN) 600 MG tablet Take 600 mg by mouth 4 times daily. Historical Provider, MD   hydrOXYzine (VISTARIL) 25 MG capsule Take 25 mg by mouth 3 times daily as needed for Itching    Historical Provider, MD   loratadine (CLARITIN) 10 MG tablet Take 10 mg by mouth daily    Historical Provider, MD   methocarbamol (ROBAXIN) 750 MG tablet Take 750 mg by mouth 4 times daily as needed     Historical Provider, MD   sertraline (ZOLOFT) 100 MG tablet Take 150 mg by mouth daily    Historical Provider, MD   NONFORMULARY Infuse 4,000 Units intravenously as needed Antihemophilic factor hum rec (Helixate FS IV)  Patient bring instructions for dosing. He used this at home as needed.   Hematologist Dr Anil Mosquera [phone]    Historical Provider, MD         Allergies:  Nsaids and Aspirin    PHYSICAL EXAM:      BP 99/64   Pulse 64   Temp 97.3 °F (36.3 °C) (Temporal)   Resp 16   Ht 5' 7\" (1.702 m)   Wt 161 lb 9.6 oz (73.3 kg)   SpO2 96%   BMI 25.31 kg/m²      Airway:  Airway patent with no audible stridor    Heart:  Regular rate and rhythm, No murmur noted    Lungs:  No increased work of breathing, good air exchange, clear to auscultation bilaterally, no crackles or wheezing    Abdomen:  Soft, non-distended, non-tender, and no masses palpated    ASSESSMENT AND PLAN    Patient is a 43 y.o. male with above specified procedure planned. 1.  The patients history and physical was obtained and signed off by the pre-admission testing department. Patient seen and focused exam done today- no new changes since last physical exam on 11/15/22    2. Access to ancillary services are available per request of the provider.     NEMO Gaytan - SHEILA     12/7/2022

## 2022-12-07 NOTE — ANESTHESIA POSTPROCEDURE EVALUATION
Department of Anesthesiology  Postprocedure Note    Patient: Armani Lee  MRN: [id number]  [place]: 1980  Date of evaluation: 12/7/2022      Procedure Summary     Date: 12/07/22 Room / Location: [address]    Anesthesia Start: 1026 Anesthesia Stop: 3890    Procedure: ANTERIOR APPROACH RIGHT TOTAL HIP ARTHROPLASTY (Right) Diagnosis:       Primary osteoarthritis of right hip      (Primary osteoarthritis of right hip [M16.11])    Surgeons: Ellie Delgado MD Responsible Provider: Frankie Brennan DO    Anesthesia Type: general ASA Status: 3          Anesthesia Type: No value filed.     Stanley Phase I: Stanley Score: 9    Stanley Phase II:        Anesthesia Post Evaluation    Patient location during evaluation: PACU  Patient participation: complete - patient participated  Level of consciousness: awake and alert  Airway patency: patent  Nausea & Vomiting: no nausea and no vomiting  Complications: no  Cardiovascular status: hemodynamically stable  Respiratory status: acceptable  Hydration status: euvolemic  Multimodal analgesia pain management approach

## 2022-12-07 NOTE — FLOWSHEET NOTE
Assumed care of pt from previous RN. Will place pt on clinical DC at 1630 as pt is waiting for a bed on 5 tower. Pt sitting up in stretcher eating. Pt reports pain 8/10. Dr. Crystal Christianson notified and said ok to give pt another 5 mg of oxycodone. Will place order and give once verified.

## 2022-12-07 NOTE — DISCHARGE INSTRUCTIONS
[facility] DISCHARGE ORDER SET  Total Hip Replacement (Anterior)    Dr. David Villa M.D.  [phone]    1. ( x  )  Activity instructions for SNF/HOME after Discharge:  Elevate extremity if swelling occurs  Continue the exercise program as prescribed by PT   Use walker, crutches or cane with weightbearing instructions as indicated by Dr. Mervat Johnson not ambulate without assistance until cleared by PT  IS Spirometer every 2 hrs while awake    Avoid sitting in low chairs or toilets  Do not cross legs  Keep your toes pointed forward while laying in bed    2. ( x  ) Initiate bowel care with the following medications   Over-The-Counter Senokot 1-2 tabs by mouth twice daily (or Over-The-Counter Colace (Docusate Sodium), continue taking while on narcotics, hold for loose stools. 3. ( x  ) Admit s/p   (  x ) right    (   ) left     (  x ) Anterior Total Hip Replacement                                 4. ( x  )  Physical Therapy - PT is not utilized after an Anterior Total Hip Replacement. - Walking/Ambulation is encouraged after surgery. Walking strengthens the hip in the post-operative period. 5. ( x  )  Weight bearing/limitations   ( x  )right  (   ) left   (X) lower extremity                                                                  (  x ) Full Weight Bearing   (   ) Non Weight Bearing        (   ) partial Weight Bearing-  25 %    7. ( x  ) Dressing/wound care  ( x ) Remove Mepilex (the large clear dressing with a silver strip in the middle) dressing on post-op day 7.     - It is important to keep your incision covered for 2 weeks after surgery. After the Mepilex (large clear dressing) dressing is removed on post-op day 7, cover your incision with sterile gauze and tegaderm for another 7 days. You may change this dressing as needed when moist/wet. - There is a mesh called Prineo underneath the larger dressing. This mesh is surgically glued over your incision.  Leave this mesh in place until you see Dr. Celestine Favre in the office for your 3 week post-operative appointment. (  ) Remove Prevena on post-operative day # 7. The battery pack attached to the purple dressing will automatically die 7 days after your surgery. A week after your surgery, peel off the dressing like you would a large band-aid and the entire dressing and battery pack may be thrown in the garbage. Apply ice over your dressing for 20 minutes prior to removing it. This cools down the adhesive and allows it to peel off of the skin easier.   - It is important to keep your incision covered for 2 weeks after surgery. After the Purple Prevena dressing is removed on post-op day 7, cover your incision with sterile gauze and tegaderm for another 7 days. You may change this dressing as needed when moist/wet. - If staples were used to close your incision: Staples are safe and may remain for up to 4 weeks post-operatively. These will be removed at your 3 week post-operative appointment. - You may shower. No Tub Baths.  -Do not scrub wound. Pat dry with soft towel. -[facility]    - Dr. Celestine Favre does not utilize home healthcare/home nursing after surgery. It is not needed after this procedure. 8. (   ) DVT PROPHYLAXIS  (  ) Thigh/knee hi mónica hose bilateral lower extremities, OFF AT NIGHT  (   ) Aspirin 81 mg daily for 4 weeks  (  ) Eliquis (Apixaban) 2.5 mg tablets 2 times a day for 4 weeks  (  )  Xarelto (Rivaroxaban) 10 mg daily for 4 weeks  (   ) If you are taking Xarelto (Rivaroxaban) or Eliquis (Apixaban), start Aspirin 325mg  2 x daily the day after you finish your Xarelto (Rivaroxaban) or Eliquis (Apixaban). Continue Aspirin until 6 weeks post op. (   ) Patient's who were on coumadin prior to surgery should resume their pre op dose  and discontinue lovenox when INR = 2.0      9. Take all medications ordered from Dr. Alee Cruz office as directed at your pre-op appointment.     [phone]  Follow up with  Nevaeh Bowden in 3 weeks for a post-operative appointment.     ELECTRONICALLY SIGNED BY: QUE Mendez, 12/7/2022

## 2022-12-08 VITALS
OXYGEN SATURATION: 96 % | RESPIRATION RATE: 16 BRPM | HEART RATE: 93 BPM | TEMPERATURE: 98.9 F | HEIGHT: 67 IN | WEIGHT: 161.6 LBS | DIASTOLIC BLOOD PRESSURE: 72 MMHG | SYSTOLIC BLOOD PRESSURE: 116 MMHG | BODY MASS INDEX: 25.36 KG/M2

## 2022-12-08 LAB
HCT VFR BLD CALC: 34.4 % (ref 40.5–52.5)
HEMOGLOBIN: 12.4 G/DL (ref 13.5–17.5)

## 2022-12-08 PROCEDURE — 6370000000 HC RX 637 (ALT 250 FOR IP): Performed by: ORTHOPAEDIC SURGERY

## 2022-12-08 PROCEDURE — G0378 HOSPITAL OBSERVATION PER HR: HCPCS

## 2022-12-08 PROCEDURE — 6360000002 HC RX W HCPCS: Performed by: INTERNAL MEDICINE

## 2022-12-08 PROCEDURE — 97116 GAIT TRAINING THERAPY: CPT

## 2022-12-08 PROCEDURE — 85014 HEMATOCRIT: CPT

## 2022-12-08 PROCEDURE — 6360000002 HC RX W HCPCS: Performed by: PEDIATRICS

## 2022-12-08 PROCEDURE — 97165 OT EVAL LOW COMPLEX 30 MIN: CPT

## 2022-12-08 PROCEDURE — 36415 COLL VENOUS BLD VENIPUNCTURE: CPT

## 2022-12-08 PROCEDURE — 97535 SELF CARE MNGMENT TRAINING: CPT

## 2022-12-08 PROCEDURE — 97161 PT EVAL LOW COMPLEX 20 MIN: CPT

## 2022-12-08 PROCEDURE — 97530 THERAPEUTIC ACTIVITIES: CPT

## 2022-12-08 PROCEDURE — 2580000003 HC RX 258: Performed by: PHYSICIAN ASSISTANT

## 2022-12-08 PROCEDURE — 6370000000 HC RX 637 (ALT 250 FOR IP): Performed by: PHYSICIAN ASSISTANT

## 2022-12-08 PROCEDURE — 6360000002 HC RX W HCPCS: Performed by: PHYSICIAN ASSISTANT

## 2022-12-08 PROCEDURE — 85018 HEMOGLOBIN: CPT

## 2022-12-08 RX ADMIN — OXYCODONE 10 MG: 5 TABLET ORAL at 05:52

## 2022-12-08 RX ADMIN — MORPHINE SULFATE 4 MG: 2 INJECTION, SOLUTION INTRAMUSCULAR; INTRAVENOUS at 04:12

## 2022-12-08 RX ADMIN — ANTIHEMOPHILIC FACTOR (RECOMBINANT) 4396 UNITS: KIT at 10:34

## 2022-12-08 RX ADMIN — SODIUM CHLORIDE, PRESERVATIVE FREE 10 ML: 5 INJECTION INTRAVENOUS at 09:37

## 2022-12-08 RX ADMIN — GABAPENTIN 600 MG: 600 TABLET, FILM COATED ORAL at 09:35

## 2022-12-08 RX ADMIN — PANTOPRAZOLE SODIUM 40 MG: 40 TABLET, DELAYED RELEASE ORAL at 05:53

## 2022-12-08 RX ADMIN — OXYCODONE 10 MG: 5 TABLET ORAL at 01:46

## 2022-12-08 RX ADMIN — ACETAMINOPHEN 650 MG: 325 TABLET, FILM COATED ORAL at 09:35

## 2022-12-08 RX ADMIN — CEFAZOLIN 2000 MG: 2 INJECTION, POWDER, FOR SOLUTION INTRAMUSCULAR; INTRAVENOUS at 04:02

## 2022-12-08 RX ADMIN — SERTRALINE HYDROCHLORIDE 150 MG: 100 TABLET ORAL at 09:35

## 2022-12-08 RX ADMIN — ACETAMINOPHEN 650 MG: 325 TABLET, FILM COATED ORAL at 01:46

## 2022-12-08 RX ADMIN — OXYCODONE 10 MG: 5 TABLET ORAL at 10:33

## 2022-12-08 RX ADMIN — HYDROMORPHONE HYDROCHLORIDE 1 MG: 1 INJECTION, SOLUTION INTRAMUSCULAR; INTRAVENOUS; SUBCUTANEOUS at 02:27

## 2022-12-08 ASSESSMENT — PAIN DESCRIPTION - FREQUENCY
FREQUENCY: CONTINUOUS

## 2022-12-08 ASSESSMENT — PAIN DESCRIPTION - LOCATION
LOCATION: HIP

## 2022-12-08 ASSESSMENT — PAIN SCALES - GENERAL
PAINLEVEL_OUTOF10: 7
PAINLEVEL_OUTOF10: 6
PAINLEVEL_OUTOF10: 7
PAINLEVEL_OUTOF10: 4
PAINLEVEL_OUTOF10: 5
PAINLEVEL_OUTOF10: 7
PAINLEVEL_OUTOF10: 4
PAINLEVEL_OUTOF10: 5
PAINLEVEL_OUTOF10: 8
PAINLEVEL_OUTOF10: 5
PAINLEVEL_OUTOF10: 5

## 2022-12-08 ASSESSMENT — PAIN DESCRIPTION - ORIENTATION
ORIENTATION: RIGHT

## 2022-12-08 ASSESSMENT — PAIN DESCRIPTION - PAIN TYPE
TYPE: SURGICAL PAIN

## 2022-12-08 ASSESSMENT — PAIN DESCRIPTION - DESCRIPTORS
DESCRIPTORS: ACHING
DESCRIPTORS: ACHING;DISCOMFORT
DESCRIPTORS: ACHING
DESCRIPTORS: ACHING
DESCRIPTORS: ACHING;DISCOMFORT
DESCRIPTORS: ACHING

## 2022-12-08 ASSESSMENT — PAIN DESCRIPTION - ONSET
ONSET: ON-GOING

## 2022-12-08 NOTE — PROGRESS NOTES
Patient admitted to room 5511. Patient is alert and oriented. Vital signs are stable. Patient able to ambulate with RN to bathroom x1 walker and gait belt. Patient tolerated ambulation well. Patient able to void urine without complication. Patient's pain is elevated to 7/10. PRN pain medication administered per MAR. Bed is in the lowest position. Bed alarm is activated. Call light is within reach. Will continue to monitor and reassess.

## 2022-12-08 NOTE — PROGRESS NOTES
PACU Transfer Note    Vitals:    12/07/22 1900   BP: 106/70   Pulse: 81   Resp: 13   Temp:    SpO2: 96%       In: 1120 [P.O.:720; I.V.:400]  Out: 0     Pain assessment:  none  Pain Level: 5    Report given to Receiving unit RN with belongings.    12/7/2022 7:36 PM

## 2022-12-08 NOTE — DISCHARGE SUMMARY
[facility] DISCHARGE SUMMARY    Pre Operative Diagnosis  Primary osteoarthritis of right hip [M16.11]    Post Operative Diagnosis  Primary osteoarthritis of right hip [M16.11]    Discharge Diagnosis Primary osteoarthritis of right hip [M16.11]    Procedure Preformed  Procedure(s):  ANTERIOR APPROACH RIGHT TOTAL HIP ARTHROPLASTY    Surgeon  Jaqueline Fermin MD     Medical course: as per medical records       The patient was taken to the operating room  where the aforementioned procedure was preformed. The patient was taken to the post operative anesthesia recovery unit in stable condition. The patient was then transferred to the orthopaedic floor for post operative pain management and convalesce. ( )The patient was placed on anticoagulation therapy for DVT prophylaxis -- PT NOT PLACED ON ANTICOAGULATION DUE TO HX OF HEMOPHILIA       The patient was discharged in stable condition. Please see medical reconciliation for discharge medications. The discharge instructions were explained to the patient and the family. The patient will follow up in the office in 3 weeks for repeat examination and xray .

## 2022-12-08 NOTE — PROGRESS NOTES
Pt A&O, VSS. Pt complains of pain which is being managed with scheduled and PRN medication given per MAR. Pt up x1 with gb and walker tolerating well. Surgical site and dressing CDI. All fall and safety precautions in place, bed locked and in lowest position, call light and belongings within reach. Pt denies further needs at this time. Plan to discharge this afternoon.

## 2022-12-08 NOTE — PLAN OF CARE
Problem: Pain  Goal: Verbalizes/displays adequate comfort level or baseline comfort level  Outcome: Progressing   RN assesses pain using 0-10 scale. Patient understands how to rate pain using 0-10 scale. Pain is controled with medication per MAR. RN encourages patient to call out for breakthrough pain. Will continue to monitor and reassess. Problem: Safety - Adult  Goal: Free from fall injury  Outcome: Progressing   RN assesses pain using 0-10 scale. Patient understands how to rate pain using 0-10 scale. Pain is controled with medication per MAR. RN encourages patient to call out for breakthrough pain. Will continue to monitor and reassess.      Problem: ABCDS Injury Assessment  Goal: Absence of physical injury  Outcome: Progressing

## 2022-12-08 NOTE — CARE COORDINATION
CM spoke with patient at bedside today. He is from home with family, independent pta, normally drives self. Patient will receive a RW from [address] [place] [address], chooses to get shower chair on his own. Family to transport at discharge.       Enedina Olszewski, RN, BSN,   4th Floor Progressive Care Unit  [phone]

## 2022-12-08 NOTE — PLAN OF CARE
Problem: Discharge Planning  Goal: Discharge to home or other facility with appropriate resources  Outcome: Progressing     Problem: Pain  Goal: Verbalizes/displays adequate comfort level or baseline comfort level  12/8/2022 0805 by Edgar Montgomery RN  Outcome: Progressing     Problem: Safety - Adult  Goal: Free from fall injury  12/8/2022 0805 by Edgar Montgomery RN  Outcome: Progressing     Problem: ABCDS Injury Assessment  Goal: Absence of physical injury  12/8/2022 0805 by Edgar Montgomery RN  Outcome: Progressing

## 2022-12-08 NOTE — PROGRESS NOTES
Physical Therapy  Facility/Department: [facility]  Physical Therapy Initial Assessment    Name: Junior Salazar  : 1980  MRN: [id number]  Date of Service: 2022    Discharge Recommendations: Junior Salazar scored a 19/24 on the AM-PAC short mobility form. At this time, no further PT is recommended upon discharge. Recommend patient returns to prior setting with prior services. PT Equipment Recommendations  Equipment Needed: Yes  Mobility Devices: Simmie Lime: Rolling      Patient Diagnosis(es): There were no encounter diagnoses. Past Medical History:  has a past medical history of Anxiety, Arthritis, Asthma, Chronic pain, Environmental allergies, Hemophilia ([address] Hepatitis C, History of blood transfusion, Major depressive disorder, Migraines, Psoriasis, and Social phobia. Past Surgical History:  has a past surgical history that includes Ankle surgery (Bilateral); Elbow surgery (Left); [place] tooth extraction; liver biopsy; Total ankle arthroplasty (Left, 2021); joint replacement; and Total hip arthroplasty (Right, 2022). Assessment   Assessment: pt is a 42 yo male s/p R JENN anterior approach IND at baseline without AD from home with parents. pt performing functional mobility with SBA- supervision at [place] with no LOB or safety concerns. increased time to complete tasks 2/2 pain however improving with activity. pt expresses no concerns for returning home and plans to dc home with initial 24hr A from parents this afternoon. No further acute PT necessary, PT to sign off.   Therapy Prognosis: Good  Decision Making: Low Complexity  Requires PT Follow-Up: No  Activity Tolerance  Activity Tolerance: Patient tolerated evaluation without incident;Patient limited by pain  Activity Tolerance Comments: inc time to complete tasks 2/2 pain     Plan   Physcial Therapy Plan  General Plan: Discharge with evaluation only  Safety Devices  Type of Devices: Call light within reach, Gait belt, Left in chair, Chair alarm in place, Nurse notified     Restrictions  Position Activity Restriction  Other position/activity restrictions: WBAT, anterior hip precautions     Subjective   General  Chart Reviewed:  Yes  Additional Pertinent Hx: Pt is a 42 yo male s/p R JENN  Referring Practitioner: Patsy Lucero MD  Diagnosis: OA of R hip  Follows Commands: Within Functional Limits  Subjective  Subjective: pt supine in bed and agreeable to PT, eager to return home         Social/Functional History  Social/Functional History  Lives With: Parent  Type of Home: House  Home Layout: One level  Home Access: Stairs to enter without rails  Entrance Stairs - Number of Steps: 1  Bathroom Shower/Tub: Tub/Shower unit  Bathroom Toilet: Standard  Home Equipment: Crutches  Has the patient had two or more falls in the past year or any fall with injury in the past year?: No  Receives Help From: Family  ADL Assistance: [address] [place] [address] Avenue: Independent  Homemaking Responsibilities: Yes  Ambulation Assistance: Independent  Transfer Assistance: Independent  Active : Yes  Occupation: Full time employment (has time off to recover)  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  Vision/Hearing  Vision  Vision: Within Functional Limits  Hearing  Hearing: Within functional limits    Cognition   Orientation  Overall Orientation Status: Within Normal Limits  Orientation Level: Oriented X4  Cognition  Overall Cognitive Status: WFL     Objective   Heart Rate: 93  Heart Rate Source: Monitor  BP: 116/72  BP Location: Left upper arm  BP Method: Automatic  Patient Position: Semi fowlers  MAP (Calculated): 87  Resp: 16  SpO2: 96 %  O2 Device: None (Room air)                 Strength RLE  Comment: DNT post surgical LE  Strength LLE  Strength LLE: WFL           Bed mobility  Supine to Sit: Stand by assistance (inc time/effort 2/2 pain)  Scooting: Stand by assistance  Transfers  Sit to Stand: Stand by assistance (at RW from EOB, toilet, and chair with arms)  Stand to Sit: Stand by assistance  Ambulation  Surface: Level tile  Device: Rolling Walker  Assistance: Stand by assistance;Supervision  Quality of Gait: steady gait improved trent with prolonged distances, step to progressing to step through  Distance: 15' x2 + 200'  Stairs/Curb  Stairs?: Yes  Stairs  Curbs: 6\"  Device: Rolling walker  Assistance: Stand by assistance  Comment: cues for sequencing     Balance  Posture: Good  Sitting - Static: Good  Sitting - Dynamic: Good  Standing - Static: Fair (SBA atRW)  Standing - Dynamic: Fair           OutComes Score                                                  AM-PAC Score  AM-PAC Inpatient Mobility Raw Score : 19 (12/08/22 1158)  AM-PAC Inpatient T-Scale Score : 45.44 (12/08/22 1158)  Mobility Inpatient CMS 0-100% Score: 41.77 (12/08/22 1158)  Mobility Inpatient CMS G-Code Modifier : CK (12/08/22 1158)          Tinneti Score       Goals  Short Term Goals  Time Frame for Short Term Goals: no acute PT goals to be addressed       Education  Patient Education  Education Given To: Patient  Education Provided: Role of Therapy;Plan of Care;Precautions  Education Method: Demonstration;Verbal  Barriers to Learning: None  Education Outcome: Verbalized understanding      Therapy Time   Individual Concurrent Group Co-treatment   Time In 0845         Time Out 0923         Minutes 38           Timed Code Treatment Minutes:  23 min     Total Treatment Minutes:  38 min       Yovani Ashley PT

## 2022-12-08 NOTE — PROGRESS NOTES
Occupational Therapy  Facility/Department: [facility]  Occupational Therapy Initial Assessment/Treatment    Name: Ramon Villeda  : 1980  MRN: [id number]  Date of Service: 2022    Discharge Recommendations:  Ramon Villeda scored a 21/24 on the AM-PAC ADL Inpatient form. At this time, no further OT is recommended upon discharge. Recommend patient returns to prior setting with prior services. OT Equipment Recommendations  Equipment Needed: Yes  Mobility Devices: ADL Assistive Devices  ADL Assistive Devices: Shower Chair with back       Patient Diagnosis(es): There were no encounter diagnoses. Past Medical History:  has a past medical history of Anxiety, Arthritis, Asthma, Chronic pain, Environmental allergies, Hemophilia ([address] Hepatitis C, History of blood transfusion, Major depressive disorder, Migraines, Psoriasis, and Social phobia. Past Surgical History:  has a past surgical history that includes Ankle surgery (Bilateral); Elbow surgery (Left); [place] tooth extraction; liver biopsy; Total ankle arthroplasty (Left, 2021); joint replacement; and Total hip arthroplasty (Right, 2022). Treatment Diagnosis: impaired ADLs and functional mobility/transferss      Assessment   Performance deficits / Impairments: Decreased functional mobility ; Decreased ADL status; Decreased balance;Decreased strength  Assessment: Pt is a 42 yo male s/p R JENN anterior approach IND at baseline with functional mobility w/o AD, ADLs/IADLs, transfers, and works fulltime. Pt is from home with parents. Pt performing LE dressing, toileting, and functional mobility with SBA- supervision at [address] [place] [address] with no LOB or safety concerns. Increased time to complete tasks 2/2 pain however improving with activity. Pt expresses no concerns for returning home and plans to dc home with initial 24hr A from parents this afternoon. Pt would benefit from shower chair upon dc to maximize safety and independence w/in home environment. No further acute OT necessary, OT to sign off.   Treatment Diagnosis: impaired ADLs and functional mobility/transferss  Prognosis: Good  Decision Making: Low Complexity  REQUIRES OT FOLLOW-UP: Yes  Activity Tolerance  Activity Tolerance: Patient Tolerated treatment well;Patient limited by pain        Plan   Occupational Therapy Plan  Times Per Week: dc OT     Restrictions  Position Activity Restriction  Other position/activity restrictions: WBAT, anterior hip precautions    Subjective   General  Chart Reviewed: Yes  Patient assessed for rehabilitation services?: Yes  Referring Practitioner: QUE Davis  Diagnosis: Osteoarthritis of R hip, unspecified  Subjective  Subjective: Pt in bed upon arrival, pleasant and agreeable to OT eval and treat     Social/Functional History  Social/Functional History  Lives With: Parent  Type of Home: House  Home Layout: One level  Home Access: Stairs to enter without rails  Entrance Stairs - Number of Steps: 1  Bathroom Shower/Tub: Tub/Shower unit  Bathroom Toilet: Standard  Home Equipment: Crutches  Has the patient had two or more falls in the past year or any fall with injury in the past year?: No  Receives Help From: Family  ADL Assistance: Independent  Homemaking Assistance: Independent  Homemaking Responsibilities: Yes  Ambulation Assistance: Independent  Transfer Assistance: Independent  Active : Yes  Occupation: Full time employment (has time off to recover)  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       Objective   Heart Rate: 93  Heart Rate Source: Monitor  BP: 116/72  BP Location: Left upper arm  BP Method: Automatic  Patient Position: Semi fowlers  MAP (Calculated): 87  Resp: 16  SpO2: 96 %  O2 Device: None (Room air)             Safety Devices  Type of Devices: Call light within reach;Gait belt;Left in chair;Chair alarm in place;Nurse notified  Balance  Sitting: Intact  Standing:  (SBA-spvn)  Gait  Overall Level of Assistance: Stand-by assistance (Pt ambulated ~200ft in hallway using RW and ambulated to/from bathroom w/ RW.)  Assistive Device: Walker, rolling  Stairs - Level of Assistance:  (pt went up/down curb step using RW w/ SBA)  Toilet Transfers  Toilet - Technique: Ambulating (w/ RW)  Equipment Used: Standard toilet  Toilet Transfer: Stand by assistance  AROM: Within functional limits  PROM: Within functional limits  Strength: Within functional limits  Coordination: Within functional limits  ADL  Feeding: Independent  UE Dressing: Independent  LE Dressing: Stand by assistance  Toileting: Stand by assistance  Toileting Skilled Clinical Factors: simulated     Activity Tolerance  Activity Tolerance: Patient tolerated evaluation without incident;Patient limited by pain  Activity Tolerance Comments: inc time to complete tasks 2/2 pain  Bed mobility  Supine to Sit: Stand by assistance (inc time/effort 2/2 pain)  Scooting: Stand by assistance  Transfers  Sit to stand: Stand by assistance  Stand to sit: Stand by assistance  Vision  Vision: Within Functional Limits  Hearing  Hearing: Within functional limits  Cognition  Overall Cognitive Status: WNL  Orientation  Overall Orientation Status: Within Normal Limits  Orientation Level: Oriented X4                  Education Given To: Patient; Family  Education Provided: Role of Therapy;Precautions; ADL Adaptive Strategies;Transfer Training; Fall Prevention Strategies  Education Method: Demonstration;Verbal  Barriers to Learning: None  Education Outcome: Verbalized understanding                     AM-PAC Score        AM-PAC Inpatient Daily Activity Raw Score: 21 (12/08/22 1314)  AM-PAC Inpatient ADL T-Scale Score : 44.27 (12/08/22 1314)  ADL Inpatient CMS 0-100% Score: 32.79 (12/08/22 1314)  ADL Inpatient CMS G-Code Modifier : CJ (12/08/22 1314)        Therapy Time   Individual Concurrent Group Co-treatment   Time In 0845         Time Out 0923         Minutes 38         Timed Code Treatment Minutes: 255[address] Sal Sparks. S.W jaswinder, OT

## 2022-12-08 NOTE — PROGRESS NOTES
[facility] Admission Assessment     I agree as the admission nurse that 2 RN's have performed a thorough Head to Toe Skin Assessment on the patient. ALL assessment sites listed below have been assessed on admission. Areas assessed by both nurses:   [x]   Head, Face, and Ears   [x]   Shoulders, Back, and Chest  [x]   Arms, Elbows, and Hands   [x]   Coccyx, Sacrum, and Ischium  [x]   Legs, Feet, and Heels        Does the Patient have Skin Breakdown?   No         French Prevention initiated:  No   Wound Care Orders initiated:  No      [facility] nurse consulted for Pressure Injury (Stage 3,4, Unstageable, DTI, NWPT, and Complex wounds) or French score 18 or lower:  No      Nurse 1 eSignature: Electronically signed by Aide Escalante RN on 12/7/22 at 9:14 PM EST    **SHARE this note so that the co-signing nurse is able to place an eSignature**    Nurse 2 eSignature: Electronically signed by Aniceto Hernandez RN on 12/8/22 at 5:02 AM EST

## 2022-12-09 ENCOUNTER — TELEPHONE (OUTPATIENT)
Dept: ORTHOPEDIC SURGERY | Age: 42
End: 2022-12-09

## 2022-12-09 NOTE — TELEPHONE ENCOUNTER
Spoke with patient    Incision status: No drainage or redness    Edema/Swelling/Teds: Some swelling in his right leg,  Using ice    Pain level and status: Rates pain at 6-7 out of 10 at rest.    Use of pain medications: Oxycodone 5mg, taking q4-6h    Blood thinner: No    Bowels: No BM since before surgery. He is taking an OTC stool softener and drinking plenty of fluids. Home Care Agency active: No    Outpatient therapy: No    Do you have all of your medications: Yes    Changes in medications: No    Instructed pt to call Nurse Navigator or surgeon's office with any questions or concerns. Follow up appointments:    No future appointments.

## 2022-12-15 ENCOUNTER — TELEPHONE (OUTPATIENT)
Dept: ORTHOPEDIC SURGERY | Age: 42
End: 2022-12-15

## 2023-04-21 LAB
HCV VIRAL RNA, PCR: HCV RNA, PCR QN LOG: 6.4 LOG IU/ML — HIGH
HCV VIRAL RNA, PCR: HCV RNA, PCR QN VIRAL LOAD: HIGH IU/ML
HCV VIRAL RNA, PCR: HCV RNA, PCR, QUAL: DETECTED

## 2023-04-25 ENCOUNTER — OFFICE (OUTPATIENT)
Dept: URBAN - METROPOLITAN AREA CLINIC 16 | Facility: CLINIC | Age: 43
End: 2023-04-25
Payer: COMMERCIAL

## 2023-04-25 VITALS
HEART RATE: 107 BPM | HEIGHT: 67 IN | WEIGHT: 165 LBS | SYSTOLIC BLOOD PRESSURE: 126 MMHG | DIASTOLIC BLOOD PRESSURE: 80 MMHG | OXYGEN SATURATION: 95 %

## 2023-04-25 DIAGNOSIS — B18.2 CHRONIC VIRAL HEPATITIS C: ICD-10-CM

## 2023-04-25 PROCEDURE — 99213 OFFICE O/P EST LOW 20 MIN: CPT

## 2023-08-24 LAB — Lab: NOT DETECTED

## 2023-10-17 ENCOUNTER — OFFICE (OUTPATIENT)
Dept: URBAN - METROPOLITAN AREA CLINIC 18 | Facility: CLINIC | Age: 43
End: 2023-10-17
Payer: COMMERCIAL

## 2023-10-17 VITALS — WEIGHT: 165 LBS | HEIGHT: 67 IN

## 2023-10-17 DIAGNOSIS — B18.2 CHRONIC VIRAL HEPATITIS C: ICD-10-CM

## 2023-10-17 DIAGNOSIS — B18.9 CHRONIC VIRAL HEPATITIS, UNSPECIFIED: ICD-10-CM

## 2023-10-17 DIAGNOSIS — K73.0 CHRONIC PERSISTENT HEPATITIS, NOT ELSEWHERE CLASSIFIED: ICD-10-CM

## 2023-10-17 PROCEDURE — 99213 OFFICE O/P EST LOW 20 MIN: CPT

## 2023-10-17 RX ORDER — OMEPRAZOLE 40 MG/1
CAPSULE, DELAYED RELEASE ORAL
Qty: 0 | Refills: 0 | Status: COMPLETED
End: 2023-10-17

## 2023-10-17 NOTE — SERVICEHPINOTES
Lv Nielsen   is a   43-year-old male patient of Dr. Berrios today for follow-up for hepatitis C. PMHx includes hemophilia and chronic hepatitis C contracted in 1980s, s/p treatment x2 in 1990s, unsuccessful with Ribavirin and interferon (records not available to review). Genotype 4. had 8-week course of Mavyret 7/2022, virus not detected on blood work 9/2022, repeat viral load checked 4/14/2023 and there was detectable hepatitis C viral load of 2,530,000. FibroScan 2020 with F0-F1 and S0. Has had prior liver biopsy (2005) and reports concern for fibrosis although I have not reviewed this. No IVDA, recent tattoo's, piercings, Hep C+ sexual relations. Repeat HCV vital load 8/29/23 with viral load [id number], genotype 4. Patient denies specific GI symptoms today. No diarrhea, constipation, nausea, vomiting, melena, hematochezia, jaundice, unintentional weight loss. br

## 2023-11-21 ENCOUNTER — OFFICE (OUTPATIENT)
Dept: URBAN - METROPOLITAN AREA CLINIC 18 | Facility: CLINIC | Age: 43
End: 2023-11-21
Payer: COMMERCIAL

## 2023-11-21 VITALS — HEIGHT: 67 IN

## 2023-11-21 DIAGNOSIS — B18.2 CHRONIC VIRAL HEPATITIS C: ICD-10-CM

## 2023-11-21 PROCEDURE — 76981 USE PARENCHYMA: CPT | Performed by: INTERNAL MEDICINE

## (undated) DEVICE — GOWN,SIRUS,POLYRNF,BRTHSLV,XLN/XL,20/CS: Brand: MEDLINE

## (undated) DEVICE — Device

## (undated) DEVICE — APPLICATOR PREP 26ML 0.7% IOD POVACRYLEX 74% ISO ALC ST

## (undated) DEVICE — BLADE SAW W21MM D85MM CUT THK1.27MM STRYKR SYS 7 HUB DISP

## (undated) DEVICE — C-ARMOR C-ARM EQUIPMENT COVERS CLEAR STERILE UNIVERSAL FIT 12 PER CASE: Brand: C-ARMOR

## (undated) DEVICE — BANDAGE,ELASTIC,ESMARK,STERILE,6"X9',LF: Brand: MEDLINE

## (undated) DEVICE — GLOVE SURG SZ 65 THK91MIL LTX FREE SYN POLYISOPRENE

## (undated) DEVICE — PLATE ES AD W 9FT CRD 2

## (undated) DEVICE — BLADE SAW W12.5MM D70MM CUT THK0.94MM BRASSELER HUB RECIP

## (undated) DEVICE — TOWEL,STOP FLAG GOLD N-W: Brand: MEDLINE

## (undated) DEVICE — STERILE POLYISOPRENE POWDER-FREE SURGICAL GLOVES WITH EMOLLIENT COATING: Brand: PROTEXIS

## (undated) DEVICE — SUTURE VCRL SZ 3-0 L27IN ABSRB UD L26MM SH 1/2 CIR J416H

## (undated) DEVICE — ORTHO PRE OP PACK: Brand: MEDLINE INDUSTRIES, INC.

## (undated) DEVICE — COUNTER NDL 40 COUNT HLD 70 NUM FOAM BLK SGL MAG W BLDE REMV

## (undated) DEVICE — SOLUTION IRRIG 3000ML 0.9% SOD CHL USP UROMATIC PLAS CONT

## (undated) DEVICE — SYRINGE IRRIG 60ML SFT PLIABLE BLB EZ TO GRP 1 HND USE W/

## (undated) DEVICE — SUTURE ETHLN SZ 3-0 L18IN NONABSORBABLE BLK FS-1 L24MM 3/8 663H

## (undated) DEVICE — 3M™ STERI-DRAPE™ U-DRAPE 1015: Brand: STERI-DRAPE™

## (undated) DEVICE — SOLUTION IV 1000ML 0.9% SOD CHL

## (undated) DEVICE — SOLUTION IV IRRIG WATER 1000ML POUR BRL 2F7114

## (undated) DEVICE — SUTURE VCRL SZ 2-0 L27IN ABSRB UD L26MM CT-2 1/2 CIR J269H

## (undated) DEVICE — MARKER SURG SKIN UTIL BLK REG TIP NONSMEARING W/ 6IN RUL

## (undated) DEVICE — DUAL CUT SAGITTAL BLADE

## (undated) DEVICE — SOLUTION IV 250ML 0.9% SOD CHL PH 5 INJ USP VIAFLX PLAS

## (undated) DEVICE — WEREWOLF FASTSEAL 6.0 HEMOSTASIS WAND: Brand: FASTSEAL 6.0 HEMOSTASIS WAND

## (undated) DEVICE — Z INACTIVE USE 2660664 SOLUTION IRRIG 3000ML 0.9% SOD CHL USP UROMATIC PLAS CONT

## (undated) DEVICE — C-ARM: Brand: UNBRANDED

## (undated) DEVICE — APPLICATOR MEDICATED 26 CC SOLUTION HI LT ORNG CHLORAPREP

## (undated) DEVICE — THIN OFFSET (13.3 X 0.38 X 42.0MM)

## (undated) DEVICE — ROLL COT W11.5INXL11FT 1LB HIGHLY ABSRB SURG GRD

## (undated) DEVICE — PEEL-AWAY HOOD: Brand: FLYTE, SURGICOOL

## (undated) DEVICE — ANTERIOR TOTAL HIP: Brand: MEDLINE INDUSTRIES, INC.

## (undated) DEVICE — GOWN,SIRUS,POLYRNF,BRTHSLV,XL,30/CS: Brand: MEDLINE

## (undated) DEVICE — SUTURE PDS II SZ 2-0 L27IN ABSRB VLT L26MM CT-2 1/2 CIR Z333H

## (undated) DEVICE — DRESSING,GAUZE,XEROFORM,CURAD,1"X8",ST: Brand: CURAD

## (undated) DEVICE — STERILE PVP: Brand: MEDLINE INDUSTRIES, INC.

## (undated) DEVICE — SUTURE VCRL SZ 1 L18IN ABSRB UD L36MM CT-1 1/2 CIR J841D

## (undated) DEVICE — GLOVE SURG SZ 85 L1185IN FNGR THK75MIL STRW LTX POLYMER

## (undated) DEVICE — E-Z CLEAN, NON-STICK, PTFE COATED, ELECTROSURGICAL BLADE ELECTRODE, 2.5 INCH (6.35 CM): Brand: EZ CLEAN

## (undated) DEVICE — UNDERGLOVE SURG SZ 8.5 FNGR THK0.21MIL GRN LTX BEAD CUF

## (undated) DEVICE — PACK PROCEDURE SURG TOTAL KNEE

## (undated) DEVICE — 3M™ IOBAN™ 2 ANTIMICROBIAL INCISE DRAPE 6640EZ: Brand: IOBAN™ 2

## (undated) DEVICE — PODIATRY PK

## (undated) DEVICE — INTENDED FOR TISSUE SEPARATION, AND OTHER PROCEDURES THAT REQUIRE A SHARP SURGICAL BLADE TO PUNCTURE OR CUT.: Brand: BARD-PARKER ® CARBON RIB-BACK BLADES

## (undated) DEVICE — SUTURE VCRL SZ 0 L18IN ABSRB UD L36MM CT-1 1/2 CIR J840D

## (undated) DEVICE — 6619 2 PTNT ISO SYS INCISE AREA&LT;(&GT;&&LT;)&GT;P: Brand: STERI-DRAPE™ IOBAN™ 2

## (undated) DEVICE — SUTURE MCRYL SZ 4-0 L27IN ABSRB UD L19MM PS-2 1/2 CIR PRIM Y426H

## (undated) DEVICE — PAD DRY FLOOR ABS 32X58IN GRN

## (undated) DEVICE — COAXIAL HIGH FLOW TIP WITH SOFT SHIELD

## (undated) DEVICE — DRESSING,GAUZE,XEROFORM,CURAD,5"X9",ST: Brand: CURAD

## (undated) DEVICE — COTTON UNDERCAST PADDING,CRIMPED FINISH: Brand: WEBRIL

## (undated) DEVICE — SOLUTION IRRIG 1000ML 09% SOD CHL USP PIC PLAS CONTAINER

## (undated) DEVICE — GLOVE SURG SZ 85 L12IN FNGR ORTHO 126MIL CRM LTX FREE

## (undated) DEVICE — SUTURE VCRL SZ 2-0 L18IN ABSRB UD CT-1 L36MM 1/2 CIR J839D

## (undated) DEVICE — SUTURE STRATAFIX SPRL SZ 1 L14IN ABSRB VLT L48CM CTX 1/2 SXPD2B405

## (undated) DEVICE — HOLDER SCALP PLAS G STD

## (undated) DEVICE — SYRINGE MED 10ML LUERLOCK TIP W/O SFTY DISP

## (undated) DEVICE — GARMENT,MEDLINE,DVT,INT,CALF,MED, GEN2: Brand: MEDLINE

## (undated) DEVICE — SYRINGE MED 30ML STD CLR PLAS LUERLOCK TIP N CTRL DISP

## (undated) DEVICE — SUTURE ETHBND EXCEL SZ 5 L30IN NONABSORBABLE GRN L40MM V-37 MB66G

## (undated) DEVICE — GAUZE,SPONGE,4"X4",16PLY,XRAY,STRL,LF: Brand: MEDLINE

## (undated) DEVICE — BLADE SAW LG 70X13X1.24 MM STRYKR SALTO TALARIS

## (undated) DEVICE — CONNECTOR TBNG WHT PLAS SUCT STR 5IN1 LTWT W/ M CONN

## (undated) DEVICE — SPLINT ORTH W5XL30IN WHT FBRGLS 1 SIDE FELT PD CNFRM LO

## (undated) DEVICE — HANDPIECE SUCTION TUBING INTERPULSE 10FT

## (undated) DEVICE — PIN DRL 12 PIN STRL DISP

## (undated) DEVICE — BANDAGE COBAN 4 IN COMPR W4INXL5YD FOAM COHESIVE QUIK STK SELF ADH SFT

## (undated) DEVICE — JEWISH HOSPITAL TURNOVER KIT: Brand: MEDLINE INDUSTRIES, INC.